# Patient Record
Sex: FEMALE | Race: BLACK OR AFRICAN AMERICAN
[De-identification: names, ages, dates, MRNs, and addresses within clinical notes are randomized per-mention and may not be internally consistent; named-entity substitution may affect disease eponyms.]

---

## 2017-12-06 ENCOUNTER — HOSPITAL ENCOUNTER (EMERGENCY)
Dept: HOSPITAL 74 - JER | Age: 67
LOS: 1 days | Discharge: HOME | End: 2017-12-07
Payer: SELF-PAY

## 2017-12-06 VITALS — DIASTOLIC BLOOD PRESSURE: 87 MMHG | TEMPERATURE: 97.6 F | SYSTOLIC BLOOD PRESSURE: 156 MMHG | HEART RATE: 72 BPM

## 2017-12-06 VITALS — BODY MASS INDEX: 30.1 KG/M2

## 2017-12-06 DIAGNOSIS — E10.649: Primary | ICD-10-CM

## 2017-12-06 DIAGNOSIS — Z79.4: ICD-10-CM

## 2017-12-06 LAB
ALBUMIN SERPL-MCNC: 3.6 G/DL (ref 3.4–5)
ALP SERPL-CCNC: 98 U/L (ref 45–117)
ALT SERPL-CCNC: 17 U/L (ref 12–78)
ANION GAP SERPL CALC-SCNC: 4 MMOL/L (ref 8–16)
AST SERPL-CCNC: 9 U/L (ref 15–37)
BASOPHILS # BLD: 1 % (ref 0–2)
BILIRUB SERPL-MCNC: 0.3 MG/DL (ref 0.2–1)
CALCIUM SERPL-MCNC: 8.6 MG/DL (ref 8.5–10.1)
CK SERPL-CCNC: 66 IU/L (ref 26–192)
CO2 SERPL-SCNC: 28 MMOL/L (ref 21–32)
CREAT SERPL-MCNC: 0.9 MG/DL (ref 0.55–1.02)
DEPRECATED RDW RBC AUTO: 14.4 % (ref 11.6–15.6)
EOSINOPHIL # BLD: 0.9 % (ref 0–4.5)
GLUCOSE SERPL-MCNC: 133 MG/DL (ref 74–106)
MAGNESIUM SERPL-MCNC: 2.1 MG/DL (ref 1.8–2.4)
MCH RBC QN AUTO: 29.8 PG (ref 25.7–33.7)
MCHC RBC AUTO-ENTMCNC: 33.2 G/DL (ref 32–36)
MCV RBC: 89.7 FL (ref 80–96)
NEUTROPHILS # BLD: 74.7 % (ref 42.8–82.8)
PLATELET # BLD AUTO: 262 K/MM3 (ref 134–434)
PMV BLD: 9.3 FL (ref 7.5–11.1)
PROT SERPL-MCNC: 7.3 G/DL (ref 6.4–8.2)
TROPONIN I SERPL-MCNC: 0.06 NG/ML (ref 0–0.05)
WBC # BLD AUTO: 8.1 K/MM3 (ref 4–10)

## 2017-12-06 NOTE — PDOC
History of Present Illness





- General


Exam Limitations: No Limitations





- History of Present Illness


Initial Comments: 





12/06/17 20:39


66 year old female, with significant past medical history of newly diagnosed 

IDDM (on both novolog and lantus), who presents to the emergency room BIBA 

after a brief episode of lightheadedness while at the [employer] just prior 

to presentation. EMS states that her blood glucose was 60 upon arrival to the 

scene and they administered 1gram of glucagon en route. The patient explains 

that as a result of being a newly diagnosed diabetic, her appetite has 

decreased. She states that she was running errands all day today and did not 

eat much throughout the day. She felt lightheaded like she was going to pass out

, so she called EMS. She denies LOC, chest pain, SOB, headache, nausea, 

vomiting. Denies recent illness, fever, chills. 





Allergies: NKDA 


PCP: Dr. Adames 








<Lucy Mcpherson - Last Filed: 12/07/17 00:25>





- General


History Source: Patient





<Cj Felix - Last Filed: 12/07/17 19:36>





- General


Chief Complaint: Blood Sugar Problem


Stated Complaint: Blood Sugar Problem


Time Seen by Provider: 12/06/17 20:25





Past History





<Lucy Mcpherson - Last Filed: 12/07/17 00:25>





- Suicide/Smoking/Psychosocial Hx


Smoking History: Never smoked


Have you smoked in the past 12 months: No


Information on smoking cessation initiated: No


Hx Alcohol Use: No


Drug/Substance Use Hx: No





<Cj Felix - Last Filed: 12/07/17 19:36>





- Past Medical History


Allergies/Adverse Reactions: 


 Allergies











Allergy/AdvReac Type Severity Reaction Status Date / Time


 


No Known Allergies Allergy   Verified 12/06/17 23:35











Home Medications: 


Ambulatory Orders





Hydrocortisone 20 mg PO BID 12/06/17 


Levothyroxine [Synthroid -] 112 mcg PO DAILY 12/06/17 


Pantoprazole Sodium [Protonix -] 20 mg PO DAILY 12/06/17 











**Review of Systems





- Review of Systems


Able to Perform ROS?: Yes


Comments:: 





12/06/17 20:39


CONSTITUTIONAL:


Present: prior to episode she felt dizzy and lightheaded, but otherwise all 

other review of systems negative. 


Absent: fever, no chills, no fatigue


EYES:


Absent: visual changes


ENT:


Absent: ear pain, no sore throat


CARDIOVASCULAR:


Absent: chest pain, no palpitations


RESPIRATORY:


Absent: cough, no SOB


GI:


Absent: abdominal pain, no nausea, no vomiting, no constipation, no diarrhea


GENITOURINARY:


Absent: dysuria, no frequency, no hematuria


MUSCULOSKELETAL:


Absent: back pain, no arthralgia, no myalgia


SKIN:


Absent: rash








<Lucy Mcpherson - Last Filed: 12/07/17 00:25>





*Physical Exam





- Vital Signs


 Last Vital Signs











Temp Pulse Resp BP Pulse Ox


 


 97.6 F   72   18   156/87   100 


 


 12/06/17 19:54  12/06/17 19:54  12/06/17 19:54  12/06/17 19:54  12/06/17 19:54














- Physical Exam


Comments: 





12/06/17 20:40


GENERAL: 


Well-appearing, well-nourished. No apparent distress.


HEENT: 


Normocephalic, atraumatic. PERRL, EOM intact.


CARDIOVASCULAR: 


Normal S1, S2. Regular rate and rhythm.


PULMONARY: 


Clear to auscultation bilaterally.


ABDOMEN: 


Soft, non-distended, non-tender. 


EXTREMITIES: 


Normal ROM in all four extremities. No gross deformities.


SKIN: 


Warm, dry.  No rash


NEUROLOGICAL: 


No focal neurological deficits.








<Lucy Mcpherson - Last Filed: 12/07/17 00:25>





- Vital Signs


 Last Vital Signs











Temp Pulse Resp BP Pulse Ox


 


 97.6 F   72   18   156/87   100 


 


 12/06/17 19:54  12/06/17 19:54  12/06/17 19:54  12/06/17 19:54  12/06/17 19:54














<Cj Felix - Last Filed: 12/07/17 19:36>





**Heart Score/ECG Review


  ** #2


General ECG Interpretation: Sinus Rhythm


Compared to previous ECG there are: No significant change





  ** #1


General ECG Interpretation: Sinus Rhythm, Normal Rate





<Lucy Mcpherson - Last Filed: 12/07/17 00:25>





ED Treatment Course





- LABORATORY


CBC & Chemistry Diagram: 


 12/06/17 20:50





 12/06/17 20:50





<Lucy Mcpherson - Last Filed: 12/07/17 00:25>





- LABORATORY


CBC & Chemistry Diagram: 


 12/06/17 20:50





 12/06/17 20:50





<Cj Felix - Last Filed: 12/07/17 19:36>





Medical Decision Making





- Medical Decision Making





12/07/17 01:11


Dr. Felix: The scribe's documentation has been prepared under my direction 

and personally reviewed by me in its entirery. I confirm that the note above 

accurately reflects all work, treatment, procedures, and medical decision 

making performed by me.


12/07/17 19:36


 Troponins x 2 were the same.  Pt felt well and was discharged home





<Cj Felix - Last Filed: 12/07/17 19:36>





*DC/Admit/Observation/Transfer





- Attestations


Scribe Attestion: 





12/06/17 20:40





Documentation prepared by MELIA Eagle, acting as medical scribe 

for Cj Felix MD.





<Lucy Mcpherson - Last Filed: 12/07/17 00:25>





- Discharge Dispostion


Admit: No





<Cj Felix - Last Filed: 12/07/17 19:36>


Diagnosis at time of Disposition: 


 Hypoglycemia, IDDM (insulin dependent diabetes mellitus)








- Discharge Dispostion


Disposition: HOME


Condition at time of disposition: Stable





- Referrals


Referrals: 


Prasanth Adames MD [Primary Care Provider] - 





- Patient Instructions


Printed Discharge Instructions:  DI for Diabetes Type 1 -- Adult, DI for 

Hypoglycemia


Additional Instructions: 


Please be sure you eat often and constantly check your sugar so you don't 

develope low blood sugar again.  Follow up with your doctor.  Return if any 

problems.  





- Post Discharge Activity

## 2017-12-07 LAB
CK SERPL-CCNC: 84 IU/L (ref 26–192)
TROPONIN I SERPL-MCNC: 0.06 NG/ML (ref 0–0.05)

## 2017-12-07 NOTE — EKG
Test Reason : 

Blood Pressure : ***/*** mmHG

Vent. Rate : 071 BPM     Atrial Rate : 071 BPM

   P-R Int : 132 ms          QRS Dur : 090 ms

    QT Int : 414 ms       P-R-T Axes : 033 013 064 degrees

   QTc Int : 449 ms

 

NORMAL SINUS RHYTHM

NONSPECIFIC T WAVE ABNORMALITY

ABNORMAL ECG

WHEN COMPARED WITH ECG OF 06-DEC-2017 21:00,

NO SIGNIFICANT CHANGE WAS FOUND

Confirmed by IGNACIO LEES MD ([id number]) on 12/7/2017 11:35:09 AM

 

Referred By:             Confirmed By:IGNACIO LEES MD

## 2017-12-07 NOTE — EKG
Test Reason : 

Blood Pressure : ***/*** mmHG

Vent. Rate : 062 BPM     Atrial Rate : 062 BPM

   P-R Int : 122 ms          QRS Dur : 114 ms

    QT Int : 406 ms       P-R-T Axes : 034 000 087 degrees

   QTc Int : 412 ms

 

NORMAL SINUS RHYTHM

MINIMAL VOLTAGE CRITERIA FOR LVH, MAY BE NORMAL VARIANT

NONSPECIFIC T WAVE ABNORMALITY

ABNORMAL ECG

WHEN COMPARED WITH ECG OF 06-DEC-2017 20:57,

PREVIOUS ECG HAS UNDETERMINED RHYTHM, NEEDS REVIEW

Confirmed by YOAAN PATTERSON, IGNACIO ([id number]) on 12/7/2017 11:34:01 AM

 

Referred By:             Confirmed By:IGNACIO LEES MD

## 2017-12-25 ENCOUNTER — HOSPITAL ENCOUNTER (OUTPATIENT)
Dept: HOSPITAL 74 - JER | Age: 67
Setting detail: OBSERVATION
LOS: 1 days | Discharge: HOME | End: 2017-12-26
Attending: INTERNAL MEDICINE | Admitting: HOSPITALIST
Payer: COMMERCIAL

## 2017-12-25 VITALS — BODY MASS INDEX: 30.1 KG/M2

## 2017-12-25 DIAGNOSIS — Z79.84: ICD-10-CM

## 2017-12-25 DIAGNOSIS — I10: ICD-10-CM

## 2017-12-25 DIAGNOSIS — E66.01: ICD-10-CM

## 2017-12-25 DIAGNOSIS — Z79.4: ICD-10-CM

## 2017-12-25 DIAGNOSIS — N17.9: ICD-10-CM

## 2017-12-25 DIAGNOSIS — E11.649: Primary | ICD-10-CM

## 2017-12-25 LAB
ALBUMIN SERPL-MCNC: 4 G/DL (ref 3.4–5)
ALP SERPL-CCNC: 86 U/L (ref 45–117)
ALT SERPL-CCNC: 17 U/L (ref 12–78)
ANION GAP SERPL CALC-SCNC: 8 MMOL/L (ref 8–16)
APPEARANCE UR: CLEAR
AST SERPL-CCNC: 11 U/L (ref 15–37)
BACTERIA #/AREA URNS HPF: (no result) /HPF
BASOPHILS # BLD AUTO: 0.1 # (ref 0.1–1)
BASOPHILS # BLD: 1.8 % (ref 0–2)
BILIRUB SERPL-MCNC: 0.5 MG/DL (ref 0.2–1)
BILIRUB UR STRIP.AUTO-MCNC: NEGATIVE MG/DL
CALCIUM SERPL-MCNC: 9.3 MG/DL (ref 8.5–10.1)
CO2 SERPL-SCNC: 29 MMOL/L (ref 21–32)
COLOR UR: (no result)
CREAT SERPL-MCNC: 1.1 MG/DL (ref 0.55–1.02)
DEPRECATED RDW RBC AUTO: 14.7 % (ref 11.6–15.6)
EOSINOPHIL # BLD: 0 # (ref 0–4.5)
EOSINOPHIL # BLD: 0.3 % (ref 0–4.5)
GLUCOSE SERPL-MCNC: 87 MG/DL (ref 74–106)
KETONES UR QL STRIP: NEGATIVE
LEUKOCYTE ESTERASE UR QL STRIP.AUTO: (no result)
LEUKOCYTE ESTERASE UR QL STRIP: (no result)
LYMPHOCYTES # BLD: 1.7 10*3/UL (ref 8–40)
MCH RBC QN AUTO: 29.2 PG (ref 25.7–33.7)
MCHC RBC AUTO-ENTMCNC: 32.6 G/DL (ref 32–36)
MCV RBC: 89.4 FL (ref 80–96)
MONOCYTES # BLD: 0.6 # (ref 3.8–10.2)
MUCOUS THREADS URNS QL MICRO: (no result)
NEUTROPHILS # BLD: 5.2 # (ref 42.8–82.8)
NEUTROPHILS # BLD: 67.7 % (ref 42.8–82.8)
NITRITE UR QL STRIP: NEGATIVE
PH UR: 6 [PH] (ref 5–8)
PHOSPHATE SERPL-MCNC: 3.5 MG/DL (ref 2.5–4.9)
PLATELET # BLD AUTO: 327 K/MM3 (ref 134–434)
PMV BLD: 8.7 FL (ref 7.5–11.1)
PROT SERPL-MCNC: 8.1 G/DL (ref 6.4–8.2)
PROT UR QL STRIP: NEGATIVE
PROT UR QL STRIP: NEGATIVE
RBC # UR STRIP: (no result) /UL
RBC #/AREA URNS HPF: 9 /HPF (ref 0–3)
SP GR UR: 1.01 (ref 1–1.03)
UROBILINOGEN UR STRIP-MCNC: NEGATIVE MG/DL (ref 0.2–1)
WBC # BLD AUTO: 7.7 K/MM3 (ref 4–10)
WBC #/AREA URNS HPF: 7 /HPF (ref 3–5)

## 2017-12-25 PROCEDURE — 3E013VG INTRODUCTION OF INSULIN INTO SUBCUTANEOUS TISSUE, PERCUTANEOUS APPROACH: ICD-10-PCS | Performed by: INTERNAL MEDICINE

## 2017-12-25 PROCEDURE — G0378 HOSPITAL OBSERVATION PER HR: HCPCS

## 2017-12-25 PROCEDURE — 3E033GC INTRODUCTION OF OTHER THERAPEUTIC SUBSTANCE INTO PERIPHERAL VEIN, PERCUTANEOUS APPROACH: ICD-10-PCS | Performed by: INTERNAL MEDICINE

## 2017-12-25 NOTE — HP
CHIEF COMPLAINT: Hypoglycemia 





PCP: Dr. Mcgowan 





HISTORY OF PRESENT ILLNESS:


67 year old AA female with Pmhx of DM , HTN who presented to ED by EMS after 

she fund unresponsive. EMS found her to be hypoglycemic of 20  , they gave her 

glucagon and sugar that improve her blood sugar to 60. 


last meal was around 12 pm , she was ready to eat dinner when her grand 

daughter found her unresponsive. patient regain her consciousness before she 

got to the hospital. 


family denies any seizure activity before or after blacked out. on ED she 

denies any complains , she denies any fever, chills, headache, lightheadedness, 

N/V/D/C. She denies any chest pain, palpitation, sob, cough, abdominal pain, or 

any urinary symptoms. she had similar one episode before . she reports having 

brain surgery this year for pituitary tumor.











ER course was notable for:


(1) Glu 87 , D5w 


(2) BUn/Cr 18/1.1


(3) CXR 





Recent Travel:denies 





PAST MEDICAL HISTORY: DM, HTN , Pituitary tumor 





PAST SURGICAL HISTORY:


brain surgery this year for Pituitary tumor 


Social History:


Smoking:denies    


Alcohol:denies 


Drugs: denies 





Family History:


Allergies





No Known Allergies Allergy (Verified 12/25/17 20:07)


 








HOME MEDICATIONS:


REVIEW OF SYSTEMS


CONSTITUTIONAL: 


Absent:  fever, chills, diaphoresis, generalized weakness, malaise, loss of 

appetite, weight change


HEENT: 


Absent:  rhinorrhea, nasal congestion, throat pain, throat swelling, difficulty 

swallowing, mouth swelling, ear pain, eye pain, visual changes


CARDIOVASCULAR: 


Absent: chest pain, syncope, palpitations, irregular heart rate, lightheadedness

, peripheral edema


RESPIRATORY: 


Absent: cough, shortness of breath, dyspnea with exertion, orthopnea, wheezing, 

stridor, hemoptysis


GASTROINTESTINAL:


Absent: abdominal pain, abdominal distension, nausea, vomiting, diarrhea, 

constipation, melena, hematochezia


GENITOURINARY: 


Absent: dysuria, frequency, urgency, hesitancy, hematuria, flank pain, genital 

pain


MUSCULOSKELETAL: 


Absent: myalgia, arthralgia, joint swelling, back pain, neck pain


SKIN: 


Absent: rash, itching, pallor


HEMATOLOGIC/IMMUNOLOGIC: 


Absent: easy bleeding, easy bruising, lymphadenopathy, frequent infections


ENDOCRINE:


Absent: unexplained weight gain, unexplained weight loss, heat intolerance, 

cold intolerance


NEUROLOGIC: 


Absent: headache, focal weakness or paresthesias, dizziness, unsteady gait, 

seizure, mental status changes, bladder or bowel incontinence


PSYCHIATRIC: 


Absent: anxiety, depression, suicidal or homicidal ideation, hallucinations.








PHYSICAL EXAMINATION


 Vital Signs - 24 hr











  12/25/17





  20:07


 


Temperature 98.1 F


 


Pulse Rate 61


 


Respiratory 14





Rate 


 


Blood Pressure 131/102


 


O2 Sat by Pulse 100





Oximetry (%) 











GENERAL: Awake, alert, and fully oriented, in no acute distress.


HEAD: Normal with no signs of trauma.


EYES: Pupils equal, round and reactive to light,  sclera anicteric, conjunctiva 

clear. 


EARS, NOSE, THROAT: Moist mucous membranes.


NECK: Normal range of motion, supple without lymphadenopathy, JVD,


LUNGS: Breath sounds equal, clear to auscultation bilaterally. No wheezes, and 

no crackles. No accessory muscle use.


HEART: Regular rate and rhythm, normal S1 and S2 without murmur, rub or gallop.


ABDOMEN: Soft, nontender, not distended, normoactive bowel sounds, no guarding, 

no rebound, no masses.  No hepatomegaly or  splenomegaly. 





LOWER EXTREMITIES: 2+ pulses, warm, well-perfused. No calf tenderness. No 

peripheral edema. 


NEUROLOGICAL:  good mentation  Normal speech. Normal gait.


PSYCHIATRIC: Cooperative. Good eye contact. Appropriate mood and affect.


SKIN: Warm, dry, normal turgor, no rashes or lesions noted, normal capillary 

refill. 


 Laboratory Results - last 24 hr











  12/25/17 12/25/17 12/25/17





  20:14 20:28 20:36


 


WBC   


 


RBC   


 


Hgb   


 


Hct   


 


MCV   


 


MCH   


 


MCHC   


 


RDW   


 


Plt Count   


 


MPV   


 


Neutrophils %   


 


Lymphocytes %   


 


Monocytes %   


 


Eosinophils %   


 


Basophils %   


 


Sodium   


 


Potassium   


 


Chloride   


 


Carbon Dioxide   


 


Anion Gap   


 


BUN   


 


Creatinine   


 


Creat Clearance w eGFR   


 


POC Glucometer  [id number]  


 


Random Glucose   


 


Calcium   


 


Phosphorus   


 


Magnesium    1.9


 


Total Bilirubin   


 


AST   


 


ALT   


 


Alkaline Phosphatase   


 


Total Protein   


 


Albumin   


 


Urine Color   Straw 


 


Urine Appearance   Clear 


 


Urine pH   6.0 


 


Ur Specific Gravity   1.009 


 


Urine Protein   Negative 


 


Urine Glucose (UA)   Negative 


 


Urine Ketones   Negative 


 


Urine Blood   1+ H 


 


Urine Nitrite   Negative 


 


Urine Bilirubin   Negative 


 


Urine Urobilinogen   Negative 


 


Ur Leukocyte Esterase   1+ H 


 


Urine WBC (Auto)   7 


 


Urine RBC (Auto)   9 


 


Ur Epithelial Cells   Rare 


 


Urine Bacteria   Rare 


 


Urine Mucus   Rare 














  12/25/17 12/25/17





  20:36 20:36


 


WBC  7.7 


 


RBC  4.95 


 


Hgb  14.4 


 


Hct  44.3 


 


MCV  89.4 


 


MCH  29.2 


 


MCHC  32.6 


 


RDW  14.7 


 


Plt Count  327 


 


MPV  8.7 


 


Neutrophils %  67.7 


 


Lymphocytes %  22.4 


 


Monocytes %  7.8 


 


Eosinophils %  0.3 


 


Basophils %  1.8 


 


Sodium   142


 


Potassium   3.5


 


Chloride   105


 


Carbon Dioxide   29


 


Anion Gap   8


 


BUN   18


 


Creatinine   1.1 H


 


Creat Clearance w eGFR   49.85


 


POC Glucometer  


 


Random Glucose   87


 


Calcium   9.3


 


Phosphorus   3.5


 


Magnesium  


 


Total Bilirubin   0.5


 


AST   11 L


 


ALT   17


 


Alkaline Phosphatase   86


 


Total Protein   8.1


 


Albumin   4.0


 


Urine Color  


 


Urine Appearance  


 


Urine pH  


 


Ur Specific Gravity  


 


Urine Protein  


 


Urine Glucose (UA)  


 


Urine Ketones  


 


Urine Blood  


 


Urine Nitrite  


 


Urine Bilirubin  


 


Urine Urobilinogen  


 


Ur Leukocyte Esterase  


 


Urine WBC (Auto)  


 


Urine RBC (Auto)  


 


Ur Epithelial Cells  


 


Urine Bacteria  


 


Urine Mucus  





 CBC, BMP





 12/25/17 20:36 





 12/25/17 20:36 








12/25/17 


CXR : reviewed 


ASSESSMENT/PLAN:


67 year old AA female with Pmhx of DM , HTN who presented to ED by EMS after 

she fund unresponsive , was found to have sever hypoglycemia and was admitted 

for further evaluation





# Hypoglycemia 


* Hold home meds 


* ISS 


* BGM Q4h


* hgbA1c 


* monitor 


* admit to observe 


* please confirm meds with pcp.


* low sodium diabetic diet 


* 


# DANITA , possible 2/2 low oral intake 


* BUN/cr 18/1.1


* IV fluids 


* repeat BMP in AM 


* urine lytes


* avoid nephrotoxic agents 


* 


# HTN, 


continue home meds





# Moribid obesity 


* consulted about loosing weight with goal one pound per week 


* consulted about diabetic diet, witl small multiple meals , and modified life 

style 


#FEN 


* D5w received in ED , 


* E: WNL , monitor 


* N: low sodium diabetic diet 











# proph 


* SCDs both legs 


* 


# dispo 


* Admit to obs 





Visit type





- Emergency Visit


Emergency Visit: Yes


ED Registration Date: 12/25/17


Care time: The patient presented to the Emergency Department on the above date 

and was hospitalized for further evaluation of their emergent condition.





- New Patient


This patient is new to me today: Yes


Date on this admission: 12/26/17





- Critical Care


Critical Care patient: No

## 2017-12-25 NOTE — PDOC
History of Present Illness





<Walker Rodriguez - Last Filed: 12/25/17 22:17>





- History of Present Illness


Initial Comments: 





12/25/17 21:06


"The patient is a 67 year old female, with a significant past medical history 

of DM and hypertension, who presents to the emergency department via EMS with 

low blood sugar. EMS reports that they were called because the family found the 

patient confused and not responding appropriately to questions. When they 

arrived, they checked her sugar and found it to be <20. They gave her glucagon 

and the sugar improved to 45, then subsequently to 60. Pt's mental status 

returned to baseline. The patient reports her last meal was around lunchtime 

and states she took her diabetes medication after eating this afternoon. Pt 

states that she is not taking insulin. However, upon review of her med list 

with her daughter via telephone, I found that she is taking Lantus and novolog. 

Daughter is unable to tell me the doses. Daughter states that pt has been 

having difficulty keeping track of her medications, often missing doses or 

taking them at the wrong times. Pt has had one prior episode of severe 

hypoglycemia which led to a similar clinical picture.





Pt currently denies any complaints. She denies recent fevers, chills, headache 

or dizziness. She denies recent nausea, vomit, diarrhea or constipation. She 

denies recent dysuria, frequency, urgency or hematuria. She denies recent chest 

pain or shortness of breath.





Allergies: NKA


Past surgical history: None reported.


Primary Care Physician: Dr. Mcgowan 


"





<Carlos Valdez - Last Filed: 12/25/17 22:53>





- General


Chief Complaint: Blood Sugar Problem


Stated Complaint: Blood Sugar Problem


Time Seen by Provider: 12/25/17 20:09





Past History





<Walker Rodriguez - Last Filed: 12/25/17 22:17>





- Suicide/Smoking/Psychosocial Hx


Smoking History: Never smoked


Have you smoked in the past 12 months: No


Information on smoking cessation initiated: No


Hx Alcohol Use: No


Drug/Substance Use Hx: No





<Carlos Valdez - Last Filed: 12/25/17 22:53>





- Past Medical History


Allergies/Adverse Reactions: 


 Allergies











Allergy/AdvReac Type Severity Reaction Status Date / Time


 


No Known Allergies Allergy   Verified 12/25/17 20:07














**Review of Systems





- Review of Systems


Comments:: 





12/25/17 21:13


"GENERAL/CONSTITUTIONAL: +Confusion. No fever or chills. No weakness. 


HEAD, EYES, EARS, NOSE AND THROAT: No change in vision. No ear pain or 

discharge. No sore throat. 


CARDIOVASCULAR: No chest pain or shortness of breath.


 RESPIRATORY: No cough, wheezing, or hemoptysis. 


GASTROINTESTINAL: No nausea, vomiting, diarrhea or constipation. 


GENITOURINARY: No dysuria, frequency, or change in urination. 


MUSCULOSKELETAL: No joint or muscle swelling or pain. No neck or back pain. 


SKIN: No rash NEUROLOGIC: No headache, vertigo, loss of consciousness, or 

change in strength/sensation. 


ENDOCRINE: No increased thirst. No abnormal weight change. 


HEMATOLOGIC/LYMPHATIC: No anemia, easy bleeding, or history of blood clots. 


ALLERGIC/IMMUNOLOGIC: No hives or skin allergy.


"





<Carlos Valdez - Last Filed: 12/25/17 22:53>





*Physical Exam





- Vital Signs


 Last Vital Signs











Temp Pulse Resp BP Pulse Ox


 


 98.1 F   61   14   131/102   100 


 


 12/25/17 20:07  12/25/17 20:07  12/25/17 20:07  12/25/17 20:07  12/25/17 20:07














<Walker Rodriguez - Last Filed: 12/25/17 22:17>





- Vital Signs


 Last Vital Signs











Temp Pulse Resp BP Pulse Ox


 


 98.1 F   61   14   131/102   100 


 


 12/25/17 20:07  12/25/17 20:07  12/25/17 20:07  12/25/17 20:07  12/25/17 20:07














- Physical Exam


Comments: 





12/25/17 21:13


"GENERAL: Awake, alert, and confused, in no acute distress


HEAD: No signs of trauma 


EYES: PERRLA, EOMI, sclera anicteric, conjunctiva clear


ENT: Auricles normal inspection, hearing grossly normal, nares patent, 

oropharynx clear without exudates. Moist mucosa 


NECK: Nontender, no stepoffs, Normal ROM, supple, no lymphadenopathy, JVD, or 

masses LUNGS: Breath sounds equal, clear to auscultation bilaterally. No wheezes

, and no crackles 


HEART: Regular rate and rhythm, normal S1 and S2, no murmurs, rubs or gallops 


ABDOMEN: Soft, nontender, normoactive bowel sounds. No guarding, no rebound. No 

masses 


EXTREMITIES: Normal range of motion, no edema. No clubbing or cyanosis. No cords

, erythema, or tenderness


NEUROLOGICAL: Cranial nerves II through XII intact. 5/5 strength and sensation 

in all extremities, Normal speech, normal gait SKIN: Warm, Dry, normal turgor, 

no rashes or lesions noted.


"





<Carlos Valdez - Last Filed: 12/25/17 22:53>





ED Treatment Course





- LABORATORY


CBC & Chemistry Diagram: 


 12/25/17 20:36





 12/25/17 20:36





- ADDITIONAL ORDERS


Additional order review: 


 Laboratory  Results











  12/25/17 12/25/17 12/25/17





  20:36 20:36 20:28


 


Sodium  142  


 


Potassium  3.5  


 


Chloride  105  


 


Carbon Dioxide  29  


 


Anion Gap  8  


 


BUN  18  


 


Creatinine  1.1 H  


 


Creat Clearance w eGFR  49.85  


 


POC Glucometer   


 


Random Glucose  87  


 


Calcium  9.3  


 


Phosphorus  3.5  


 


Magnesium   1.9 


 


Total Bilirubin  0.5  


 


AST  11 L  


 


ALT  17  


 


Alkaline Phosphatase  86  


 


Total Protein  8.1  


 


Albumin  4.0  


 


Urine Color    Straw


 


Urine Appearance    Clear


 


Urine pH    6.0


 


Ur Specific Gravity    1.009


 


Urine Protein    Negative


 


Urine Glucose (UA)    Negative


 


Urine Ketones    Negative


 


Urine Blood    1+ H


 


Urine Nitrite    Negative


 


Urine Bilirubin    Negative


 


Urine Urobilinogen    Negative


 


Urine WBC (Auto)    7


 


Urine RBC (Auto)    9


 


Ur Epithelial Cells    Rare


 


Urine Bacteria    Rare


 


Urine Mucus    Rare














  12/25/17





  20:14


 


Sodium 


 


Potassium 


 


Chloride 


 


Carbon Dioxide 


 


Anion Gap 


 


BUN 


 


Creatinine 


 


Creat Clearance w eGFR 


 


POC Glucometer  71.34722


 


Random Glucose 


 


Calcium 


 


Phosphorus 


 


Magnesium 


 


Total Bilirubin 


 


AST 


 


ALT 


 


Alkaline Phosphatase 


 


Total Protein 


 


Albumin 


 


Urine Color 


 


Urine Appearance 


 


Urine pH 


 


Ur Specific Gravity 


 


Urine Protein 


 


Urine Glucose (UA) 


 


Urine Ketones 


 


Urine Blood 


 


Urine Nitrite 


 


Urine Bilirubin 


 


Urine Urobilinogen 


 


Urine WBC (Auto) 


 


Urine RBC (Auto) 


 


Ur Epithelial Cells 


 


Urine Bacteria 


 


Urine Mucus 








 











  12/25/17 12/25/17





  20:36 20:14


 


RBC  4.95 


 


MCV  89.4 


 


MCHC  32.6 


 


RDW  14.7 


 


MPV  8.7 


 


Neutrophils %  67.7 


 


Lymphocytes %  22.4 


 


Monocytes %  7.8 


 


Eosinophils %  0.3 


 


Basophils %  1.8 


 


POC Glucometer   71.79106














- Medications


Given in the ED: 


ED Medications














Discontinued Medications














Generic Name Dose Route Start Last Admin





  Trade Name Freq  PRN Reason Stop Dose Admin


 


Dextrose  25 gm  12/25/17 20:18  12/25/17 20:53





  D50w (Vial) -  IVPUSH  12/25/17 20:19  25 gm





  NOW ONE   Administration














<Walker Rodriguez - Last Filed: 12/25/17 22:17>





- LABORATORY


CBC & Chemistry Diagram: 


 12/25/17 20:36





 12/25/17 20:36





- ADDITIONAL ORDERS


Additional order review: 


 Laboratory  Results











  12/25/17 12/25/17





  20:28 20:14


 


POC Glucometer   71.38071


 


Urine Color  Straw 


 


Urine Appearance  Clear 


 


Urine pH  6.0 


 


Ur Specific Gravity  1.009 


 


Urine Protein  Negative 


 


Urine Glucose (UA)  Negative 


 


Urine Ketones  Negative 


 


Urine Blood  1+ H 


 


Urine Nitrite  Negative 


 


Urine Bilirubin  Negative 


 


Urine Urobilinogen  Negative 


 


Urine WBC (Auto)  7 


 


Urine RBC (Auto)  9 


 


Ur Epithelial Cells  Rare 


 


Urine Bacteria  Rare 


 


Urine Mucus  Rare 








 











  12/25/17 12/25/17





  20:36 20:14


 


RBC  4.95 


 


MCV  89.4 


 


MCHC  32.6 


 


RDW  14.7 


 


MPV  8.7 


 


Neutrophils %  67.7 


 


Lymphocytes %  22.4 


 


Monocytes %  7.8 


 


Eosinophils %  0.3 


 


Basophils %  1.8 


 


POC Glucometer   71.78157














- RADIOLOGY


Radiology Studies Ordered: 














 Category Date Time Status


 


 CHEST X-RAY PORTABLE* [RAD] Stat Radiology  12/25/17 20:19 Taken














- Medications


Given in the ED: 


ED Medications














Discontinued Medications














Generic Name Dose Route Start Last Admin





  Trade Name Mariama  PRN Reason Stop Dose Admin


 


Dextrose  25 gm  12/25/17 20:18  12/25/17 20:53





  D50w (Vial) -  IVPUSH  12/25/17 20:19  25 gm





  NOW ONE   Administration














<Carlos Valdez - Last Filed: 12/25/17 22:53>





Medical Decision Making





- Medical Decision Making





12/25/17 21:13


67 F with altered mental status, now resolved, found to be 2/2 severe 

hypoglycemia. Pt now at neurologic baseline, AnO x3, with no neuro deficits. Pt'

s hypoglycemia likely 2/2 med non-compliance given daughter's history that pt 

often confuses her medications. Pt is on Lantus and Novolog, unclear what doses 

and what time pt actually took the meds.


- Labs


- Frequent fingersticks


- Will require obs admission due to long half-life of Lantus





12/25/17 22:53


CBC,CMP











WBC  7.7 K/mm3 (4.0-10.0)   12/25/17  20:36    


 


RBC  4.95 M/mm3 (3.60-5.2)   12/25/17  20:36    


 


Hgb  14.4 GM/dL (10.7-15.3)   12/25/17  20:36    


 


Hct  44.3 % (32.4-45.2)   12/25/17  20:36    


 


MCV  89.4 fl (80-96)   12/25/17  20:36    


 


MCH  29.2 pg (25.7-33.7)   12/25/17  20:36    


 


MCHC  32.6 g/dl (32.0-36.0)   12/25/17  20:36    


 


RDW  14.7 % (11.6-15.6)   12/25/17  20:36    


 


Plt Count  327 K/MM3 (134-434)   12/25/17  20:36    


 


MPV  8.7 fl (7.5-11.1)   12/25/17  20:36    


 


Neutrophils %  67.7 % (42.8-82.8)   12/25/17  20:36    


 


Lymphocytes %  22.4 % (8-40)   12/25/17  20:36    


 


Monocytes %  7.8 % (3.8-10.2)   12/25/17  20:36    


 


Eosinophils %  0.3 % (0-4.5)   12/25/17  20:36    


 


Basophils %  1.8 % (0-2.0)   12/25/17  20:36    


 


Sodium  142 mmol/L (136-145)   12/25/17  20:36    


 


Potassium  3.5 mmol/L (3.5-5.1)   12/25/17  20:36    


 


Chloride  105 mmol/L ()   12/25/17  20:36    


 


Carbon Dioxide  29 mmol/L (21-32)   12/25/17  20:36    


 


Anion Gap  8  (8-16)   12/25/17  20:36    


 


BUN  18 mg/dL (7-18)   12/25/17  20:36    


 


Creatinine  1.1 mg/dL (0.55-1.02)  H  12/25/17  20:36    


 


Creat Clearance w eGFR  49.85  (>60)   12/25/17  20:36    


 


POC Glucometer  71.36495 UNITS ()   12/25/17  20:14    


 


Random Glucose  87 mg/dL ()   12/25/17  20:36    


 


Calcium  9.3 mg/dL (8.5-10.1)   12/25/17  20:36    


 


Phosphorus  3.5 mg/dL (2.5-4.9)   12/25/17  20:36    


 


Magnesium  1.9 mg/dL (1.8-2.4)   12/25/17  20:36    


 


Total Bilirubin  0.5 mg/dL (0.2-1.0)   12/25/17  20:36    


 


AST  11 U/L (15-37)  L  12/25/17  20:36    


 


ALT  17 U/L (12-78)   12/25/17  20:36    


 


Alkaline Phosphatase  86 U/L ()   12/25/17  20:36    


 


Total Protein  8.1 g/dl (6.4-8.2)   12/25/17  20:36    


 


Albumin  4.0 g/dl (3.4-5.0)   12/25/17  20:36    








Spoke with Dr. Stokes, who has accepted pt for admission to obs.





<Carols Valdez - Last Filed: 12/25/17 22:53>





*DC/Admit/Observation/Transfer





- Attestations


Scribe Attestion: 





12/25/17 22:17





Documentation prepared by Walker Rodriguez, acting as medical scribe for Carlos Valdez MD.





<Walker Rodriguez - Last Filed: 12/25/17 22:17>





- Discharge Dispostion


Admit: Yes





- Attestations


Physician Attestion: 





12/25/17 22:53








I, Dr. Carlos Valdez MD, attest that this document has been prepared under my 

direction and personally reviewed by me in its entirety.   I further attest, 

that it accurately reflects all work, treatment, procedures and medical decision

-making performed by me.  





<Carlos Valdez - Last Filed: 12/25/17 22:53>


Diagnosis at time of Disposition: 


 Hypoglycemia








- Referrals


Referrals: 


Vu Mcgowan MD [Primary Care Provider] - 





- Patient Instructions





- Post Discharge Activity

## 2017-12-26 VITALS — DIASTOLIC BLOOD PRESSURE: 65 MMHG | HEART RATE: 59 BPM | TEMPERATURE: 98.2 F | SYSTOLIC BLOOD PRESSURE: 116 MMHG

## 2017-12-26 LAB
ALBUMIN SERPL-MCNC: 3.4 G/DL (ref 3.4–5)
ALP SERPL-CCNC: 81 U/L (ref 45–117)
ALT SERPL-CCNC: 16 U/L (ref 12–78)
ANION GAP SERPL CALC-SCNC: 10 MMOL/L (ref 8–16)
AST SERPL-CCNC: 12 U/L (ref 15–37)
BASOPHILS # BLD AUTO: 0.1 # (ref 0.1–1)
BASOPHILS # BLD: 1 % (ref 0–2)
BILIRUB SERPL-MCNC: 0.4 MG/DL (ref 0.2–1)
CALCIUM SERPL-MCNC: 9.6 MG/DL (ref 8.5–10.1)
CO2 SERPL-SCNC: 28 MMOL/L (ref 21–32)
CREAT SERPL-MCNC: 1 MG/DL (ref 0.55–1.02)
DEPRECATED RDW RBC AUTO: 14.9 % (ref 11.6–15.6)
EOSINOPHIL # BLD: 0.1 # (ref 0–4.5)
EOSINOPHIL # BLD: 1.5 % (ref 0–4.5)
GLUCOSE SERPL-MCNC: 100 MG/DL (ref 74–106)
LYMPHOCYTES # BLD: 2.4 10*3/UL (ref 8–40)
MCH RBC QN AUTO: 29.9 PG (ref 25.7–33.7)
MCHC RBC AUTO-ENTMCNC: 33.4 G/DL (ref 32–36)
MCV RBC: 89.5 FL (ref 80–96)
MONOCYTES # BLD: 0.6 # (ref 3.8–10.2)
NEUTROPHILS # BLD: 5 # (ref 42.8–82.8)
NEUTROPHILS # BLD: 60.5 % (ref 42.8–82.8)
PLATELET # BLD AUTO: 318 K/MM3 (ref 134–434)
PMV BLD: 9.2 FL (ref 7.5–11.1)
PROT SERPL-MCNC: 6.7 G/DL (ref 6.4–8.2)
WBC # BLD AUTO: 8.3 K/MM3 (ref 4–10)

## 2017-12-26 RX ADMIN — INSULIN ASPART SCH: 100 INJECTION, SOLUTION INTRAVENOUS; SUBCUTANEOUS at 08:13

## 2017-12-26 RX ADMIN — INSULIN ASPART SCH UNITS: 100 INJECTION, SOLUTION INTRAVENOUS; SUBCUTANEOUS at 12:42

## 2017-12-26 RX ADMIN — HEPARIN SODIUM SCH UNIT: 5000 INJECTION, SOLUTION INTRAVENOUS; SUBCUTANEOUS at 06:24

## 2017-12-26 RX ADMIN — HEPARIN SODIUM SCH: 5000 INJECTION, SOLUTION INTRAVENOUS; SUBCUTANEOUS at 16:02

## 2017-12-26 RX ADMIN — INSULIN ASPART SCH UNITS: 100 INJECTION, SOLUTION INTRAVENOUS; SUBCUTANEOUS at 16:43

## 2017-12-26 NOTE — PN
Teaching Attending Note


Name of Resident: Scout Chiu





ATTENDING PHYSICIAN STATEMENT





I saw and evaluated the patient.


I reviewed the resident's note and discussed the case with the resident.


I agree with the resident's findings and plan as documented.








SUBJECTIVE:








OBJECTIVE:








ASSESSMENT AND PLAN:


patient was admitted for hypoglycemia


monitor for signs of hypoglycemia 


education for insulin use

## 2017-12-26 NOTE — DS
Physical Examination


Vital Signs: 


 Vital Signs











Temperature  36.8 C   12/26/17 09:00


 


Pulse Rate  54 L  12/26/17 12:32


 


Respiratory Rate  18   12/26/17 12:32


 


Blood Pressure  128/70   12/26/17 12:32


 


O2 Sat by Pulse Oximetry (%)  98   12/26/17 12:32











Constitutional: Yes: Well Nourished, No Distress, Calm


Cardiovascular: Yes: Regular Rate and Rhythm.  No: Gallop, Murmur, Rub


Respiratory: Yes: Regular, CTA Bilaterally.  No: Rales, Rhonchi, Wheezes


Gastrointestinal: Yes: Normal Bowel Sounds, Soft.  No: Distention, Tenderness


Extremities: Yes: WNL


Edema: No


Labs: 


 CBC, BMP





 12/26/17 06:36 





 12/26/17 06:36 











Discharge Summary


Reason For Visit: HYPOGLYCEMIA


Current Active Problems





Hypoglycemia (Acute)








Hospital Course: 


Ms Farnsworth is a very pleasant 67 year old female who comes in with 

hypoglycemia. She says she took her metformin but did not eat and that is what 

caused her blood sugar to drop. She was admitted under observation. She was 

given dextrose in saline and her glucose improved. Her IVFs were stopped and 

she was given lunch which she tolerated. She is currently safe for discharge 

home. She was instructed to change her metformin to daily and have close follow 

up with Dr Mcgowan.


 


31 minutes spent in preparation of this discharge


Condition: Good





- Instructions


Diet, Activity, Other Instructions: 


resume previous diet and activity


Referrals: 


Vu Mcgowan MD [Primary Care Provider] - 


Disposition: HOME





- Home Medications


Comprehensive Discharge Medication List: 


Ambulatory Orders





Amlodipine Besylate 5 mg PO DAILY 12/26/17 


Cholecalciferol (Vitamin D3) [Vitamin D3] 2,000 unit PO DAILY 12/26/17 


Levothyroxine Sodium [Levo-T] 75 mcg PO DAILY 12/26/17 


Metformin HCl 500 mg PO DAILY #30 tablet 12/26/17 


Metoprolol Tartrate 50 mg PO BID 12/26/17 


Pantoprazole Sodium 40 mg PO DAILY 12/26/17

## 2017-12-26 NOTE — EKG
Test Reason : 

Blood Pressure : ***/*** mmHG

Vent. Rate : 061 BPM     Atrial Rate : 061 BPM

   P-R Int : 120 ms          QRS Dur : 102 ms

    QT Int : 428 ms       P-R-T Axes : 050 002 089 degrees

   QTc Int : 430 ms

 

NORMAL SINUS RHYTHM

NONSPECIFIC ST AND T WAVE ABNORMALITY

ABNORMAL ECG

WHEN COMPARED WITH ECG OF 25-DEC-2017 20:56,

PREVIOUS ECG HAS UNDETERMINED RHYTHM, NEEDS REVIEW

Confirmed by MD Epi, Ky ([id number]) on 12/26/2017 1:09:59 PM

 

Referred By:             Confirmed By:Ky Chowdary MD

## 2018-01-14 ENCOUNTER — HOSPITAL ENCOUNTER (INPATIENT)
Dept: HOSPITAL 74 - JER | Age: 68
LOS: 4 days | Discharge: HOME | DRG: 194 | End: 2018-01-18
Attending: INTERNAL MEDICINE | Admitting: INTERNAL MEDICINE
Payer: COMMERCIAL

## 2018-01-14 VITALS — BODY MASS INDEX: 30.7 KG/M2

## 2018-01-14 DIAGNOSIS — D72.819: ICD-10-CM

## 2018-01-14 DIAGNOSIS — J69.0: ICD-10-CM

## 2018-01-14 DIAGNOSIS — D70.9: ICD-10-CM

## 2018-01-14 DIAGNOSIS — J11.08: Primary | ICD-10-CM

## 2018-01-14 DIAGNOSIS — N17.9: ICD-10-CM

## 2018-01-14 DIAGNOSIS — E11.9: ICD-10-CM

## 2018-01-14 DIAGNOSIS — E03.9: ICD-10-CM

## 2018-01-14 DIAGNOSIS — R56.9: ICD-10-CM

## 2018-01-14 DIAGNOSIS — I10: ICD-10-CM

## 2018-01-14 DIAGNOSIS — J11.81: ICD-10-CM

## 2018-01-14 DIAGNOSIS — E78.5: ICD-10-CM

## 2018-01-14 DIAGNOSIS — R41.82: ICD-10-CM

## 2018-01-14 DIAGNOSIS — D35.2: ICD-10-CM

## 2018-01-14 DIAGNOSIS — E86.0: ICD-10-CM

## 2018-01-14 NOTE — PDOC
Attending Attestation





- Resident


Resident Name: Zaid Turner





- ED Attending Attestation


I have performed the following: I have examined & evaluated the patient, The 

case was reviewed & discussed with the resident, I agree w/resident's findings 

& plan





- HPI


HPI: 





01/15/18 01:39


Pt comes with AMS. Pt comes with lethargy and fever. Pt had a seizure in the 

ambulance as she was driving here.


Pt has normal labs, except for a positive troponin - which is prossibly solely 

cardiac damage, as she has flipped and flat Lateral T waves on the EKG. She is 

influenza A positive, and we are unable to give her Tamiflu, as she is altered.





- Physicial Exam


PE: 





01/14/18 23:54


pt is febrile; unkempt; pt has coarse breath sounds bilaterally; likely 

aspirated after her seizure; or she has a pneumonia.


Pt 





- Medical Decision Making





01/15/18 00:28


Pt has + influenza A


01/15/18 00:49


Patient Name: RANDA BISWAS


THIS IS A PRELIMINARY REPORT FROM IMAGING ON CALL


DATE OF SERVICE: 2018-01-15 00:04:13


IMAGES: 141


EXAM: CT HEAD without contrast


HISTORY: New onset lethargy


COMPARISON: None.


FINDINGS: The ventricular system is midline and nondilated. The sulcal pattern 

is normal for the


patient's age. Mild small vessel ischemic changes are noted. There is a 2.9 x 

2.9 cm suprasellar


mass with minimal calcification. Neoplasm and aneurysm are both considered. 

There is no bleed,


extra-axial fluid collection or mass effect. No skull fracture or skull lesion 

is identified. Diffuse sinus


opacification consistent with sinusitis. The bilateral mastoid air cells are 

clear.


IMPRESSION: 2.9 cm suprasellar mass may indicate neoplasm or aneurysm and 

should be


followed up with enhanced MRI/MRA or enhanced CT/CTA.


Diffuse sinusitis.


THIS DOCUMENT HAS BEEN ELECTRONICALLY SIGNED


01/15/18 01:00


Pt will need an MRI/MRA in the AM to eval the suprasellar findings (we know 

that she has a pituitary tumor, but our understanding is that she had the tumor 

removed.)


Pt has low O2 Sat on RA; she has coarse BS bilaterally.   She may have 

pneumonia or aspiration. CXR demonstrates enlarged heart and right sided 

increased markings in the lung fields


Pt has dry mouth poor dentition.  She appears dehydrated. Na/K+ electrolytes 

are normal





EKG shows flat flipped T waves laterally.


She has a + trop.


Pt is febrile to 103


01/15/18 01:11


Pt admitted to [facility] last month.  We put a page out for them.





01/15/18 02:05





Patient Name: RANDA BISWAS


THIS IS A PRELIMINARY REPORT FROM IMAGING ON CALL


DATE OF SERVICE: 2018-01-14 23:31:01


IMAGES: 2


EXAM: XR CHEST


HISTORY: Rule out pneumonia


COMPARISON: None.


FINDINGS: Left ventricular hypertrophy is noted. Left lower lobe consolidation 

may represent


pneumonia. There is a questionable small left pleural effusion.


IMPRESSION: Possible left lower lobe pneumonia with small effusion.

## 2018-01-14 NOTE — PDOC
History of Present Illness





<Destiny Whitaker - Last Filed: 01/15/18 02:20>





- General


History Source: Patient


Exam Limitations: No Limitations





- History of Present Illness


Initial Comments: 





01/14/18 23:08


The patient is a 67F with a PMH of HTN, DM, seizure disorder, hypothyroidism, s/

p pituitary ca removal who presents to the ED from home with lethargy. Hx is 

provided only by EMS. EMS states that the patient has had increased lethargy 

and AMS starting today. She's had decreased PO intake. EMS states that the 

patient had a seizure on their way to the ambulance. This was stopped with 5 

midazolam. No other history was provided. 








<Zaid Turner - Last Filed: 01/15/18 03:31>





- General


Chief Complaint: Lethargy


Stated Complaint: ALTERED MENTAL STATUS


Time Seen by Provider: 01/14/18 22:47





Past History





<Destiny Whitaker - Last Filed: 01/15/18 02:20>





- Past Medical History


Anemia: No


Asthma: No


Cancer: No


Cardiac Disorders: No


CVA: No


COPD: No


CHF: No


Dementia: No


Diabetes: Yes


GI Disorders: No


 Disorders: No


HTN: Yes


Hypercholesterolemia: No


Liver Disease: No


Seizures: No


Thyroid Disease: No





- Surgical History


Abdominal Surgery: No


Appendectomy: No


Cardiac Surgery: No


Cholecystectomy: No


Lung Surgery: No


Neurologic Surgery: No


Orthopedic Surgery: No





- Suicide/Smoking/Psychosocial Hx


Smoking History: Unknown if ever smoked


Have you smoked in the past 12 months: No


Information on smoking cessation initiated: No


Hx Alcohol Use: No


Drug/Substance Use Hx: No


Substance Use Type: None


Hx Substance Use Treatment: No





<Zaid Turner - Last Filed: 01/15/18 03:31>





- Past Medical History


Allergies/Adverse Reactions: 


 Allergies











Allergy/AdvReac Type Severity Reaction Status Date / Time


 


No Known Allergies Allergy   Verified 01/14/18 22:46











Home Medications: 


Ambulatory Orders





Amlodipine Besylate 5 mg PO DAILY 12/26/17 


Cholecalciferol (Vitamin D3) [Vitamin D3] 2,000 unit PO DAILY 12/26/17 


Levothyroxine Sodium [Levo-T] 75 mcg PO DAILY 12/26/17 


Metformin HCl 500 mg PO DAILY #30 tablet 12/26/17 


Metoprolol Tartrate 50 mg PO BID 12/26/17 


Pantoprazole Sodium 40 mg PO DAILY 12/26/17 











**Review of Systems





- Review of Systems


Able to Perform ROS?: No (Clinical condition)


Is the patient limited English proficient: No





<Zaid Turner - Last Filed: 01/15/18 03:31>





*Physical Exam





- Vital Signs


 Last Vital Signs











Temp Pulse Resp BP Pulse Ox


 


 103.9 F H  92 H  16   148/92   93 L


 


 01/15/18 00:01  01/14/18 22:46  01/14/18 22:46  01/14/18 22:46  01/14/18 22:46














<Maricel Whitakerreen - Last Filed: 01/15/18 02:20>





- Vital Signs


 Last Vital Signs











Temp Pulse Resp BP Pulse Ox


 


 103.4 F H  92 H  16   148/92   93 L


 


 01/14/18 23:03  01/14/18 22:46  01/14/18 22:46  01/14/18 22:46  01/14/18 22:46














- Physical Exam


Comments: 





01/14/18 23:32


GENERAL: Well developed, well nourished. Lethargic. 


HEENT: Normocephalic, atraumatic. Hearing grossly normal. Moist mucous 

membranes. 


NECK: Supple. Full ROM. No JVD.


CARDIOVASCULAR: Regular rate and rhythm. No murmurs, rubs, or gallops. 


PULMONARY: No evidence of respiratory distress. Transmitted upper respiratory 

sounds heard bilaterally.


ABDOMINAL: Soft. Non-tender. Non-distended. No rebound or guarding.  


MUSCULOSKELETAL: No bony deformities or tenderness. 


EXTREMITIES: No cyanosis. No clubbing. No edema. No calf tenderness.


SKIN: Warm and dry. Normal capillary refill. No rashes. No jaundice. 


NEUROLOGICAL: Lethargic, localizes to painful stimuli, not responding to verbal 

stimuli. 


PSYCHIATRIC: Cooperative. Good eye contact. Appropriate mood and affect.








<Zaid Turner - Last Filed: 01/15/18 03:31>





ED Treatment Course





- LABORATORY


CBC & Chemistry Diagram: 


 01/14/18 23:55





 01/14/18 23:55





- ADDITIONAL ORDERS


Additional order review: 


 Laboratory  Results











  01/14/18 01/14/18 01/14/18





  23:55 23:55 23:55


 


PT with INR    13.10 H


 


INR    1.16 H


 


PTT (Actin FS)  32.8  


 


Sodium   140 


 


Potassium   3.5 


 


Chloride   104 


 


Carbon Dioxide   27 


 


Anion Gap   9 


 


BUN   6 L 


 


Creatinine   1.2 H 


 


Creat Clearance w eGFR   45.09 


 


Random Glucose   137 H 


 


Calcium   8.9 


 


Total Bilirubin   0.9  D 


 


AST   62 H D 


 


ALT   59  D 


 


Alkaline Phosphatase   69 


 


Creatine Kinase   93 


 


Troponin I   0.13 H 


 


Total Protein   7.2 


 


Albumin   3.7 




















 01/14/18 00:01 Influenza Types A,B Antigen (REINA) - Preliminary





 Nasopharyngeal Swab  - Preliminary








 











  01/14/18





  23:55


 


RBC  4.68


 


MCV  88.7


 


MCHC  33.4


 


RDW  14.9


 


MPV  9.7


 


Neutrophils %  62.6


 


Lymphocytes %  21.4  D


 


Monocytes %  13.3 H


 


Eosinophils %  1.4


 


Basophils %  1.3














- RADIOLOGY


Radiology Studies Ordered: 














 Category Date Time Status


 


 CHEST X-RAY PORTABLE* [RAD] Stat Radiology  01/14/18 23:03 Taken














- Medications


Given in the ED: 


ED Medications














Discontinued Medications














Generic Name Dose Route Start Last Admin





  Trade Name Freq  PRN Reason Stop Dose Admin


 


Acetaminophen  1,000 mg  01/14/18 23:29  01/14/18 23:53





  Ofirmev Injection -  IVPB  01/14/18 23:30  1,000 mg





  ONCE ONE   Administration


 


Sodium Chloride  1,000 ml  01/14/18 23:14  01/14/18 23:52





  Normal Saline -  IV  01/14/18 23:15  1,000 ml





  ONCE ONE   Administration














<Destiny Whitaker - Last Filed: 01/15/18 02:20>





- LABORATORY


CBC & Chemistry Diagram: 


 01/14/18 23:55





 01/14/18 23:55





<Zaid Turner - Last Filed: 01/15/18 03:31>





Medical Decision Making





- Medical Decision Making





01/15/18 03:21


The patient is a 67F with a PMH of HTN, DM, seizure disorder, hypothyroidism, s/

p pituitary ca removal who presents to the ED with lethargy. BGL was 170. 

Septic protocol is being followed as the patient has a temp of 103. Flu A 

positive. Fluids and broad spectrum abx with IV tylenol being given. 





CXR shows new onset PNA.





Attending admitted pt to ICU for further monitoring. 





<Zaid Turner - Last Filed: 01/15/18 03:31>





*DC/Admit/Observation/Transfer





- Discharge Dispostion


Admit: Yes





<Destiny Whitaker - Last Filed: 01/15/18 02:20>





<Zaid Turner - Last Filed: 01/15/18 03:31>


Diagnosis at time of Disposition: 


 Seizure, Influenza A, Altered mental status, Fever, Rhonchi, Pneumonia








- Discharge Dispostion


Condition at time of disposition: Guarded

## 2018-01-15 LAB
ALBUMIN SERPL-MCNC: 3.7 G/DL (ref 3.4–5)
ALP SERPL-CCNC: 69 U/L (ref 45–117)
ALT SERPL-CCNC: 59 U/L (ref 12–78)
ANION GAP SERPL CALC-SCNC: 11 MMOL/L (ref 8–16)
ANION GAP SERPL CALC-SCNC: 9 MMOL/L (ref 8–16)
APPEARANCE UR: CLEAR
AST SERPL-CCNC: 62 U/L (ref 15–37)
BASOPHILS # BLD: 1.2 % (ref 0–2)
BASOPHILS # BLD: 1.3 % (ref 0–2)
BILIRUB SERPL-MCNC: 0.9 MG/DL (ref 0.2–1)
BILIRUB UR STRIP.AUTO-MCNC: NEGATIVE MG/DL
BUN SERPL-MCNC: 6 MG/DL (ref 7–18)
BUN SERPL-MCNC: 7 MG/DL (ref 7–18)
CALCIUM SERPL-MCNC: 8.4 MG/DL (ref 8.5–10.1)
CALCIUM SERPL-MCNC: 8.9 MG/DL (ref 8.5–10.1)
CHLORIDE SERPL-SCNC: 104 MMOL/L (ref 98–107)
CHLORIDE SERPL-SCNC: 107 MMOL/L (ref 98–107)
CHOLEST SERPL-MCNC: 181 MG/DL (ref 50–200)
CO2 SERPL-SCNC: 23 MMOL/L (ref 21–32)
CO2 SERPL-SCNC: 27 MMOL/L (ref 21–32)
COLOR UR: YELLOW
CREAT SERPL-MCNC: 1 MG/DL (ref 0.55–1.02)
CREAT SERPL-MCNC: 1.2 MG/DL (ref 0.55–1.02)
DEPRECATED RDW RBC AUTO: 14.9 % (ref 11.6–15.6)
DEPRECATED RDW RBC AUTO: 14.9 % (ref 11.6–15.6)
EOSINOPHIL # BLD: 1.4 % (ref 0–4.5)
EOSINOPHIL # BLD: 1.4 % (ref 0–4.5)
EPITH CASTS URNS QL MICRO: (no result) /HPF
GLUCOSE SERPL-MCNC: 103 MG/DL (ref 74–106)
GLUCOSE SERPL-MCNC: 137 MG/DL (ref 74–106)
HCT VFR BLD CALC: 38.9 % (ref 32.4–45.2)
HCT VFR BLD CALC: 41.5 % (ref 32.4–45.2)
HDLC SERPL-MCNC: 43 MG/DL (ref 40–60)
HGB BLD-MCNC: 12.7 GM/DL (ref 10.7–15.3)
HGB BLD-MCNC: 13.9 GM/DL (ref 10.7–15.3)
INR BLD: 1.16 (ref 0.82–1.09)
KETONES UR QL STRIP: NEGATIVE
LDLC SERPL CALC-MCNC: 128 MG/DL (ref 5–100)
LEUKOCYTE ESTERASE UR QL STRIP.AUTO: NEGATIVE
LYMPHOCYTES # BLD: 21.4 % (ref 8–40)
LYMPHOCYTES # BLD: 23.3 % (ref 8–40)
MAGNESIUM SERPL-MCNC: 1.1 MG/DL (ref 1.8–2.4)
MCH RBC QN AUTO: 28.9 PG (ref 25.7–33.7)
MCH RBC QN AUTO: 29.7 PG (ref 25.7–33.7)
MCHC RBC AUTO-ENTMCNC: 32.6 G/DL (ref 32–36)
MCHC RBC AUTO-ENTMCNC: 33.4 G/DL (ref 32–36)
MCV RBC: 88.6 FL (ref 80–96)
MCV RBC: 88.7 FL (ref 80–96)
MONOCYTES # BLD AUTO: 13.3 % (ref 3.8–10.2)
MONOCYTES # BLD AUTO: 15.8 % (ref 3.8–10.2)
MUCOUS THREADS URNS QL MICRO: (no result)
NEUTROPHILS # BLD: 58.3 % (ref 42.8–82.8)
NEUTROPHILS # BLD: 62.6 % (ref 42.8–82.8)
NITRITE UR QL STRIP: NEGATIVE
PH BLDV: 7.36 [PH] (ref 7.32–7.42)
PH UR: 5 [PH] (ref 5–8)
PHOSPHATE SERPL-MCNC: 3.2 MG/DL (ref 2.5–4.9)
PLATELET # BLD AUTO: 218 K/MM3 (ref 134–434)
PLATELET # BLD AUTO: 245 K/MM3 (ref 134–434)
PMV BLD: 10 FL (ref 7.5–11.1)
PMV BLD: 9.7 FL (ref 7.5–11.1)
POTASSIUM SERPLBLD-SCNC: 3.4 MMOL/L (ref 3.5–5.1)
POTASSIUM SERPLBLD-SCNC: 3.5 MMOL/L (ref 3.5–5.1)
PROT SERPL-MCNC: 7.2 G/DL (ref 6.4–8.2)
PROT UR QL STRIP: NEGATIVE
PROT UR QL STRIP: NEGATIVE
PT PNL PPP: 13.1 SEC (ref 9.98–11.88)
RBC # BLD AUTO: 4.39 M/MM3 (ref 3.6–5.2)
RBC # BLD AUTO: 4.68 M/MM3 (ref 3.6–5.2)
RBC # UR STRIP: (no result) /UL
SODIUM SERPL-SCNC: 140 MMOL/L (ref 136–145)
SODIUM SERPL-SCNC: 141 MMOL/L (ref 136–145)
SP GR UR: 1.02 (ref 1–1.03)
TRIGL SERPL-MCNC: 135 MG/DL (ref 35–160)
UROBILINOGEN UR STRIP-MCNC: NEGATIVE MG/DL (ref 0.2–1)
VENOUS PC02: 44.3 MMHG (ref 38–52)
VENOUS PO2: 35.2 MMHG (ref 28–48)
WBC # BLD AUTO: 4.8 K/MM3 (ref 4–10)
WBC # BLD AUTO: 7 K/MM3 (ref 4–10)

## 2018-01-15 RX ADMIN — SODIUM CHLORIDE SCH MLS/HR: 9 INJECTION, SOLUTION INTRAVENOUS at 04:38

## 2018-01-15 RX ADMIN — INSULIN ASPART SCH: 100 INJECTION, SOLUTION INTRAVENOUS; SUBCUTANEOUS at 06:28

## 2018-01-15 RX ADMIN — OSELTAMIVIR PHOSPHATE SCH MG: 75 CAPSULE ORAL at 22:09

## 2018-01-15 RX ADMIN — MUPIROCIN SCH APPLIC: 20 OINTMENT TOPICAL at 22:07

## 2018-01-15 RX ADMIN — HEPARIN SODIUM SCH UNIT: 5000 INJECTION, SOLUTION INTRAVENOUS; SUBCUTANEOUS at 13:35

## 2018-01-15 RX ADMIN — INSULIN ASPART SCH: 100 INJECTION, SOLUTION INTRAVENOUS; SUBCUTANEOUS at 17:38

## 2018-01-15 RX ADMIN — LEVOTHYROXINE SODIUM SCH MCG: 75 TABLET ORAL at 06:28

## 2018-01-15 RX ADMIN — OSELTAMIVIR PHOSPHATE SCH MG: 30 CAPSULE ORAL at 09:31

## 2018-01-15 RX ADMIN — MUPIROCIN SCH APPLIC: 20 OINTMENT TOPICAL at 09:34

## 2018-01-15 RX ADMIN — LEVETIRACETAM SCH MG: 250 TABLET, FILM COATED ORAL at 22:09

## 2018-01-15 RX ADMIN — HEPARIN SODIUM SCH UNIT: 5000 INJECTION, SOLUTION INTRAVENOUS; SUBCUTANEOUS at 06:28

## 2018-01-15 RX ADMIN — INSULIN ASPART SCH: 100 INJECTION, SOLUTION INTRAVENOUS; SUBCUTANEOUS at 12:58

## 2018-01-15 RX ADMIN — METOPROLOL TARTRATE SCH MG: 50 TABLET, FILM COATED ORAL at 22:08

## 2018-01-15 RX ADMIN — HEPARIN SODIUM SCH UNIT: 5000 INJECTION, SOLUTION INTRAVENOUS; SUBCUTANEOUS at 22:07

## 2018-01-15 RX ADMIN — METOPROLOL TARTRATE SCH MG: 50 TABLET, FILM COATED ORAL at 09:33

## 2018-01-15 RX ADMIN — OSELTAMIVIR PHOSPHATE SCH MG: 30 CAPSULE ORAL at 04:37

## 2018-01-15 NOTE — PN
Progress Note, Physician


Chief Complaint: 





Patient feels comfortable , more alert still minimal confusion, denies any head 

ache





- Current Medication List


Current Medications: 


Active Medications





Acetaminophen (Tylenol -)  650 mg PO Q6H PRN


   PRN Reason: FEVER


Amlodipine Besylate (Norvasc -)  5 mg PO DAILY [facility]


   Last Admin: 01/15/18 09:33 Dose:  5 mg


Chlorhexidine Gluconate (Hibiclens For Decolonization -)  1 applic TP HS [facility]


Cholecalciferol (Vitamin D3 -)  2,000 unit PO DAILY [facility]


   Last Admin: 01/15/18 09:33 Dose:  2,000 unit


Heparin Sodium (Porcine) (Heparin -)  5,000 unit SQ TID [facility]


   Last Admin: 01/15/18 06:28 Dose:  5,000 unit


Sodium Chloride (Normal Saline -)  1,000 mls @ 75 mls/hr IV ASDIR [facility]


   Last Admin: 01/15/18 04:38 Dose:  75 mls/hr


Azithromycin 500 mg/ Dextrose  250 mls @ 250 mls/hr IVPB DAILY [facility]


   Last Admin: 01/15/18 09:32 Dose:  250 mls/hr


CEFTRIAXONE 1 G/50 ML PREMIX (Ceftriaxone 1 Gm-D5w Bag)  50 mls @ 100 mls/hr 

IVPB DAILY [facility]


   Last Admin: 01/15/18 09:33 Dose:  100 mls/hr


Insulin Aspart (Novolog Vial Sliding Scale -)  1 vial SQ TIDAC [facility]


   PRN Reason: Protocol


   Last Admin: 01/15/18 12:58 Dose:  Not Given


Insulin Aspart (Novolog Vial Sliding Scale -)  1 vial SQ [facility]


   PRN Reason: Protocol


Levetiracetam (Keppra -)  250 mg PO BID [facility]


   Last Admin: 01/15/18 09:31 Dose:  250 mg


Levothyroxine Sodium (Synthroid -)  75 mcg PO DAILY@0700 [facility]


   Last Admin: 01/15/18 06:28 Dose:  75 mcg


Metoprolol Tartrate (Lopressor -)  50 mg PO BID [facility]


   Last Admin: 01/15/18 09:33 Dose:  50 mg


Mupirocin (Bactroban Ointment (For Decolonization) -)  1 applic NS BID [facility]


   Stop: 01/20/18 09:59


   Last Admin: 01/15/18 09:34 Dose:  1 applic


Oseltamivir Phosphate (Tamiflu -)  30 mg PO BID [facility]


   Stop: 01/20/18 03:44


   Last Admin: 01/15/18 09:31 Dose:  30 mg


Pantoprazole Sodium (Protonix -)  40 mg PO DAILY [facility]


   Last Admin: 01/15/18 09:33 Dose:  40 mg











- Objective


Vital Signs: 


 Vital Signs











Temperature  99.1 F   01/15/18 06:59


 


Pulse Rate  64   01/15/18 09:00


 


Respiratory Rate  22   01/15/18 09:00


 


Blood Pressure  148/73   01/15/18 09:00


 


O2 Sat by Pulse Oximetry (%)  98   01/15/18 09:00














Middle aged F not in distress





HEENT: Mm moist anemia, PERRLA, EOMI





NECK; No JVD No Bruit





CHEST: CTA B/L





CVS: S1S2 R





ABD: No distention non tender Bs +





EXT: Miguel Ángel afeet





CNS: alert but lethargic and confused ortherwise non focal








Labs: 


 CBC, BMP





 01/15/18 05:20 





 01/15/18 05:20 





 INR, PTT











INR  1.16  (0.82-1.09)  H  01/14/18  23:55    














Problem List





- Problems


(1) Altered mental status


Assessment/Plan: 


Most likely brakthrough seizures cont  keppra f/u Neurology consult input


Code(s): R41.82 - ALTERED MENTAL STATUS, UNSPECIFIED   





(2) Influenza A


Assessment/Plan: 


On tamiflu


Code(s): J10.1 - FLU DUE TO OTH IDENT INFLUENZA VIRUS W OTH RESP MANIFEST   





(3) Pneumonia


Assessment/Plan: 


CABP  on Ceftraixone 


Code(s): J18.9 - PNEUMONIA, UNSPECIFIED ORGANISM   





(4) Seizure


Assessment/Plan: 


Known case of seizures admitted with winessed seizure by EMs responded to Versed


Code(s): R56.9 - UNSPECIFIED CONVULSIONS   





(5) Pituitary adenoma


Assessment/Plan: 


S/P surgery in 10/17 F/U Neurology recommondations


Code(s): D35.2 - BENIGN NEOPLASM OF PITUITARY GLAND   





(6) Elevated troponin I level


Assessment/Plan: 


Most likely Demand ischemia F/U ECHO serial trop I, Cont ASA  F/U HbA!c Lipid 

panel and TSH no acute St T chnages no c/o chest pain 


Code(s): R74.8 - ABNORMAL LEVELS OF OTHER SERUM ENZYMES   





(7) Dehydration


Assessment/Plan: 


Cont IV hydration


Code(s): E86.0 - DEHYDRATION   





(8) T2DM (type 2 diabetes mellitus)


Assessment/Plan: 


Cont Correction dose insulin


Code(s): E11.9 - TYPE 2 DIABETES MELLITUS WITHOUT COMPLICATIONS

## 2018-01-15 NOTE — PN
Progress Note (short form)





- Note


Progress Note: 





ID Consult dictated


Acute influenza A


S/p seizure


Possible aspiration pneumonia


S/P resection, pituitary tumor





Obtain c/s


Tamiflu/ Droplet precautions


Empiric ceftriaxone for possible aspiration pneumonia

## 2018-01-15 NOTE — CON.NEURO
Consult





- Past Medical History


CNS: Yes: Other (Brain Surgery (s/p ptuitary tumor resection 10/2017).)


Cardio/Vascular: Yes: HTN


Gastrointestinal: Yes: GERD


Hepatobiliary: No: Cirrhosis


Renal/: No: Renal Failure


...Pregnant: No


Infectious Disease: No: AIDS


Psych: No: Addictions, Anxiety


Endocrine: Yes: Diabetes Mellitus





- Past Surgical History


Additional Surgical History: (s/p pituitary resection 10/2017)





- Alcohol/Substance Use


Hx Alcohol Use: No





- Smoking History


Smoking history: Unknown if ever smoked


Have you smoked in the past 12 months: No





- Social History


History of Recent Travel: No





Home Medications





- Allergies


Allergies/Adverse Reactions: 


 Allergies











Allergy/AdvReac Type Severity Reaction Status Date / Time


 


No Known Allergies Allergy   Verified 01/14/18 22:46














- Home Medications


Home Medications: 


Ambulatory Orders





Amlodipine Besylate 5 mg PO DAILY 12/26/17 


Cholecalciferol (Vitamin D3) [Vitamin D3] 2,000 unit PO DAILY 12/26/17 


Levothyroxine Sodium [Levo-T] 75 mcg PO DAILY 12/26/17 


Metformin HCl 500 mg PO DAILY #30 tablet 12/26/17 


Metoprolol Tartrate 50 mg PO BID 12/26/17 


Pantoprazole Sodium 40 mg PO DAILY 12/26/17 











Physical Exam-Neuro


Vital Signs: 


 Vital Signs











Temperature  99.1 F   01/15/18 06:59


 


Pulse Rate  64   01/15/18 09:00


 


Respiratory Rate  22   01/15/18 09:00


 


Blood Pressure  148/73   01/15/18 09:00


 


O2 Sat by Pulse Oximetry (%)  98   01/15/18 09:00











Labs: 


 CBC, BMP





 01/15/18 05:20 





 01/15/18 05:20 





 INR, PTT











INR  1.16  (0.82-1.09)  H  01/14/18  23:55    














Assessment/Plan


cc Brought to hospital for [facility] and on way to hospital she had seizrues





HPI 67 year old female hsitory of Pituitary tumor s/p resection in october 2017 

at [facility]. Patient is now back to normal. Initially 

suspected to have pneumonia, She is being treated with azithromycin and 

Tamiflu. Patient had ct scan of brain showed there is enlargement of Pituitary 

area. 


Patient also have history of htn, dm, seizure disorder, hypothyrodism and she 

was on hydrocortisone


PMH as above.


FH,ROS SH reviewed in chart


HOME MEDICATIONS:


 3





 Medication  Instructions  Recorded


 


Amlodipine Besylate 5 mg PO DAILY 12/26/17


 


Cholecalciferol (Vitamin D3) 2,000 unit PO DAILY 12/26/17





[Vitamin D3]  


 


Levothyroxine Sodium [Levo-T] 75 mcg PO DAILY 12/26/17


 


Metformin HCl 500 mg PO DAILY #30 tablet 12/26/17


 


Metoprolol Tartrate 50 mg PO BID 12/26/17


 


Pantoprazole Sodium 40 mg PO DAILY 12/26/17


 


humalog  as per EMS


 


lantus  as per EMS


 


keppra  as per EMS











Neurological Examination


Alert oriented x 3, speech is normal able to follow command


EOMI , vf is normal by confrontation and pupils is reactive


No facial asymmetry, 


moving all extremity and sensation is normal





ct scan findings reviewed ( prior to current ct scan , mri of brain was done 

2013),








Assessment/PLAN:


1.Breakthrough seizures , she was on keppra 250 mg po bid, it is quite a low 

dose, suggest to increase to 750 mg po bid


  -- Increase keppra 750 mg po bid


-- EEG


2. Worsening of Pituitary tumor? less likley as she recently had surgery done 

and 


- suggest to do mri of brain with contrast 


- our Neurosurgery consult


-may need further input from Endocrinologist and Optho , and Patient may be 

better off following at [facility] , if nothing urgent 

identified. 





Thank you so much





Froilan Mathew MD

## 2018-01-15 NOTE — PN
Physical Exam: 


SUBJECTIVE: Patient seen and examined


The patient is a 67 year old female with a history of HTN, DM, HLD, pituitary 

tumor s/p resection in 10/17 who presented for lethargy and AMS.  Per EMS the 

patient had a seizure or seizure like activity en route to the ED and was given 

5mg of midazolam at the time.  She currently reports no complaints and states 

that her surgery was done at [facility] by a Dr. Miller.  She states 

that she has never had seizures in the past and has never been on medication 

for seizures.  She reports that she feels otherwise well.





OBJECTIVE:





 Vital Signs











 Period  Temp  Pulse  Resp  BP Sys/Weston  Pulse Ox


 


 Last 24 Hr  99.1 F-103.9 F  64-92  16-35  103-148/63-92  93-98











GENERAL: The patient is awake, alert, and fully oriented, in no acute distress.


HEAD: Normal with no signs of trauma.


EYES:  sclera anicteric, conjunctiva clear. No ptosis. 


ENT:  oropharynx clear without exudates, moist mucous 


membranes.


NECK: Trachea midline, full range of motion, supple. 


LUNGS: Breath sounds equal, clear to auscultation bilaterally, no wheezes, no 

crackles, no 


accessory muscle use. 


HEART: Regular rate and rhythm, S1, S2 without murmur, rub or gallop.


ABDOMEN: Soft, nontender, nondistended, normoactive bowel sounds, no guarding, 

no 


rebound, no hepatosplenomegaly, no masses.


EXTREMITIES: 2+ pulses, warm, well-perfused, no edema. 


NEUROLOGICAL: Normal speech, gait not 


observed.


PSYCH: Normal mood, normal affect.


SKIN: Warm, dry, normal turgor, no rashes or lesions noted














 Laboratory Results - last 24 hr











  01/14/18 01/14/18 01/14/18





  23:55 23:55 23:55


 


WBC  7.0  


 


RBC  4.68  


 


Hgb  13.9  


 


Hct  41.5  


 


MCV  88.7  


 


MCH  29.7  


 


MCHC  33.4  


 


RDW  14.9  


 


Plt Count  245  D  


 


MPV  9.7  


 


Neutrophils %  62.6  


 


Lymphocytes %  21.4  D  


 


Monocytes %  13.3 H  


 


Eosinophils %  1.4  


 


Basophils %  1.3  


 


PT with INR   13.10 H 


 


INR   1.16 H 


 


PTT (Actin FS)   


 


VBG pH   


 


POC VBG pCO2   


 


POC VBG pO2   


 


Mixed VBG HCO3   


 


Sodium    140


 


Potassium    3.5


 


Chloride    104


 


Carbon Dioxide    27


 


Anion Gap    9


 


BUN    6 L


 


Creatinine    1.2 H


 


Creat Clearance w eGFR    45.09


 


POC Glucometer   


 


Random Glucose    137 H


 


Lactic Acid   


 


Calcium    8.9


 


Phosphorus   


 


Magnesium   


 


Total Bilirubin    0.9  D


 


AST    62 H D


 


ALT    59  D


 


Alkaline Phosphatase    69


 


Creatine Kinase    93


 


Creatine Kinase Index   


 


CK-MB (CK-2)   


 


Troponin I    0.13 H


 


Total Protein    7.2


 


Albumin    3.7


 


Urine Color   


 


Urine Appearance   


 


Urine pH   


 


Ur Specific Gravity   


 


Urine Protein   


 


Urine Glucose (UA)   


 


Urine Ketones   


 


Urine Blood   


 


Urine Nitrite   


 


Urine Bilirubin   


 


Urine Urobilinogen   


 


Ur Leukocyte Esterase   


 


Urine WBC (Auto)   


 


Urine RBC (Auto)   


 


Ur Epithelial Cells   


 


Urine Mucus   














  01/14/18 01/15/18 01/15/18





  23:55 00:30 00:30


 


WBC   


 


RBC   


 


Hgb   


 


Hct   


 


MCV   


 


MCH   


 


MCHC   


 


RDW   


 


Plt Count   


 


MPV   


 


Neutrophils %   


 


Lymphocytes %   


 


Monocytes %   


 


Eosinophils %   


 


Basophils %   


 


PT with INR   


 


INR   


 


PTT (Actin FS)  32.8  


 


VBG pH   7.36 


 


POC VBG pCO2   44.3 


 


POC VBG pO2   35.2 


 


Mixed VBG HCO3   24.3 


 


Sodium   


 


Potassium   


 


Chloride   


 


Carbon Dioxide   


 


Anion Gap   


 


BUN   


 


Creatinine   


 


Creat Clearance w eGFR   


 


POC Glucometer   


 


Random Glucose   


 


Lactic Acid    1.5


 


Calcium   


 


Phosphorus   


 


Magnesium   


 


Total Bilirubin   


 


AST   


 


ALT   


 


Alkaline Phosphatase   


 


Creatine Kinase   


 


Creatine Kinase Index   


 


CK-MB (CK-2)   


 


Troponin I   


 


Total Protein   


 


Albumin   


 


Urine Color   


 


Urine Appearance   


 


Urine pH   


 


Ur Specific Gravity   


 


Urine Protein   


 


Urine Glucose (UA)   


 


Urine Ketones   


 


Urine Blood   


 


Urine Nitrite   


 


Urine Bilirubin   


 


Urine Urobilinogen   


 


Ur Leukocyte Esterase   


 


Urine WBC (Auto)   


 


Urine RBC (Auto)   


 


Ur Epithelial Cells   


 


Urine Mucus   














  01/15/18 01/15/18 01/15/18





  01:22 05:20 05:20


 


WBC   4.8  D 


 


RBC   4.39 


 


Hgb   12.7 


 


Hct   38.9 


 


MCV   88.6 


 


MCH   28.9 


 


MCHC   32.6 


 


RDW   14.9 


 


Plt Count   218 


 


MPV   10.0 


 


Neutrophils %   58.3 


 


Lymphocytes %   23.3 


 


Monocytes %   15.8 H 


 


Eosinophils %   1.4 


 


Basophils %   1.2 


 


PT with INR   


 


INR   


 


PTT (Actin FS)   


 


VBG pH   


 


POC VBG pCO2   


 


POC VBG pO2   


 


Mixed VBG HCO3   


 


Sodium    141


 


Potassium    3.4 L


 


Chloride    107


 


Carbon Dioxide    23


 


Anion Gap    11


 


BUN    7


 


Creatinine    1.0


 


Creat Clearance w eGFR   


 


POC Glucometer   


 


Random Glucose    103


 


Lactic Acid   


 


Calcium    8.4 L


 


Phosphorus    3.2


 


Magnesium    1.1 L D


 


Total Bilirubin   


 


AST   


 


ALT   


 


Alkaline Phosphatase   


 


Creatine Kinase    215 H


 


Creatine Kinase Index    0.4


 


CK-MB (CK-2)    < 1.000


 


Troponin I    0.13 H


 


Total Protein   


 


Albumin   


 


Urine Color  Yellow  


 


Urine Appearance  Clear  


 


Urine pH  5.0  


 


Ur Specific Gravity  1.017  


 


Urine Protein  Negative  


 


Urine Glucose (UA)  Negative  


 


Urine Ketones  Negative  


 


Urine Blood  1+ H  


 


Urine Nitrite  Negative  


 


Urine Bilirubin  Negative  


 


Urine Urobilinogen  Negative  


 


Ur Leukocyte Esterase  Negative  


 


Urine WBC (Auto)  1  


 


Urine RBC (Auto)  2  


 


Ur Epithelial Cells  Rare  


 


Urine Mucus  Rare  














  01/15/18





  06:23


 


WBC 


 


RBC 


 


Hgb 


 


Hct 


 


MCV 


 


MCH 


 


MCHC 


 


RDW 


 


Plt Count 


 


MPV 


 


Neutrophils % 


 


Lymphocytes % 


 


Monocytes % 


 


Eosinophils % 


 


Basophils % 


 


PT with INR 


 


INR 


 


PTT (Actin FS) 


 


VBG pH 


 


POC VBG pCO2 


 


POC VBG pO2 


 


Mixed VBG HCO3 


 


Sodium 


 


Potassium 


 


Chloride 


 


Carbon Dioxide 


 


Anion Gap 


 


BUN 


 


Creatinine 


 


Creat Clearance w eGFR 


 


POC Glucometer  [id number]


 


Random Glucose 


 


Lactic Acid 


 


Calcium 


 


Phosphorus 


 


Magnesium 


 


Total Bilirubin 


 


AST 


 


ALT 


 


Alkaline Phosphatase 


 


Creatine Kinase 


 


Creatine Kinase Index 


 


CK-MB (CK-2) 


 


Troponin I 


 


Total Protein 


 


Albumin 


 


Urine Color 


 


Urine Appearance 


 


Urine pH 


 


Ur Specific Gravity 


 


Urine Protein 


 


Urine Glucose (UA) 


 


Urine Ketones 


 


Urine Blood 


 


Urine Nitrite 


 


Urine Bilirubin 


 


Urine Urobilinogen 


 


Ur Leukocyte Esterase 


 


Urine WBC (Auto) 


 


Urine RBC (Auto) 


 


Ur Epithelial Cells 


 


Urine Mucus 








Active Medications











Generic Name Dose Route Start Last Admin





  Trade Name Freq  PRN Reason Stop Dose Admin


 


Acetaminophen  650 mg  01/15/18 03:24  





  Tylenol -  PO   





  Q6H PRN   





  FEVER   


 


Amlodipine Besylate  5 mg  01/15/18 10:00  01/15/18 09:33





  Norvasc -  PO   5 mg





  DAILY VERONIKA   Administration


 


Chlorhexidine Gluconate  1 applic  01/15/18 22:00  





  Hibiclens For Decolonization -  TP   





  HS VERONIKA   


 


Cholecalciferol  2,000 unit  01/15/18 10:00  01/15/18 09:33





  Vitamin D3 -  PO   2,000 unit





  DAILY VERONIKA   Administration


 


Heparin Sodium (Porcine)  5,000 unit  01/15/18 06:00  01/15/18 06:28





  Heparin -  SQ   5,000 unit





  TID VERONIKA   Administration


 


Sodium Chloride  1,000 mls @ 75 mls/hr  01/15/18 04:15  01/15/18 04:38





  Normal Saline -  IV   75 mls/hr





  ASDIR VERONIKA   Administration


 


Azithromycin 500 mg/ Dextrose  250 mls @ 250 mls/hr  01/15/18 10:00  01/15/18 09

:32





  IVPB   250 mls/hr





  DAILY VERONIKA   Administration


 


CEFTRIAXONE 1 G/50 ML PREMIX  50 mls @ 100 mls/hr  01/15/18 10:00  01/15/18 09:

33





  Ceftriaxone 1 Gm-D5w Bag  IVPB   100 mls/hr





  DAILY VERONIKA   Administration


 


Insulin Aspart  1 vial  01/15/18 07:00  01/15/18 12:58





  Novolog Vial Sliding Scale -  SQ   Not Given





  TIDAC [facility]   





  Protocol   


 


Insulin Aspart  1 vial  01/15/18 22:00  





  Novolog Vial Sliding Scale -  SQ   





  HS [facility]   





  Protocol   


 


Levetiracetam  250 mg  01/15/18 10:00  01/15/18 09:31





  Keppra -  PO   250 mg





  BID VERONIKA   Administration


 


Levothyroxine Sodium  75 mcg  01/15/18 07:00  01/15/18 06:28





  Synthroid -  PO   75 mcg





  DAILY@0700 VERONIKA   Administration


 


Metoprolol Tartrate  50 mg  01/15/18 10:00  01/15/18 09:33





  Lopressor -  PO   50 mg





  BID VERONIKA   Administration


 


Mupirocin  1 applic  01/15/18 10:00  01/15/18 09:34





  Bactroban Ointment (For Decolonization) -  NS  01/20/18 09:59  1 applic





  BID VERONIKA   Administration


 


Oseltamivir Phosphate  30 mg  01/15/18 03:45  01/15/18 09:31





  Tamiflu -  PO  01/20/18 03:44  30 mg





  BID VERONIKA   Administration


 


Pantoprazole Sodium  40 mg  01/15/18 10:00  01/15/18 09:33





  Protonix -  PO   40 mg





  DAILY VERONIKA   Administration











ASSESSMENT/PLAN:


The patient is a 67 year old female with a history of HTN, DM, HLD, pituitary 

tumor s/p resection in 10/17 who presented for lethargy and AMS treated with 

5mg of midazolam for seizure like activity en rout to the ED found to be 

influenza A positive with a concern for a pneumonia on chest plain film.








NEURO


#Seizure


-Continue keppra 250mg BID


-No seizure activity since presentation





CV


#HTN


-Continue home medications


-Continue to monitor vitals





RESP


Improving infiltrate on plain film today.  Patient denies any symptoms 

currently.


-Continue treatment with Tamiflu for influenza.


-Continue treatment with azithromycin and ceftriaxone for possible pneumonia.





GI


No issues currently.





Heme


No Issues currently.





Renal


-Good GFR.


-Will continue to monitor.





MSK


No issues currently





FEN/GI


-Replete electrolytes PRN, will monitor








PPX


-Continue protonix 40mg daily.


-Continue SCDs





DISPO: Stable for med/surg transfer.








Visit type





- Emergency Visit


Emergency Visit: No





- New Patient


This patient is new to me today: Yes


Date on this admission: 01/15/18





- Critical Care


Critical Care patient: No

## 2018-01-15 NOTE — CONSULT
Consult


Consult Specialty:: PULM/CCM


Referred by:: 


Reason for Consultation:: AMS





- History of Present Illness


Chief Complaint: AMS


History of Present Illness: 


Ms. Farnsworth is a 66 y/o woman w/ HTN, DM, Sz disorder, hypothyroid, & a 

pituitary tumor (s/p resection 10/2017), BIBA O/N for AMS. (Of note, EMS adm IV 

Versed X 5mg enroute for reported Sz activity). In ED, pt remark for Fever > 103

, lethargy/delerium, a troponin leak --> 0.13, Flu +, LLL opacity, & a NCHCT c/

f progression of Brain CA (2.9 cm suprasellar mass c/f neoplasm). Of note, pt 

endorses that she did NOT secure her flu or PNA vaccine this season.  Pt adm to 

the ICU for AMS.





- History Source


History Provided By: Patient, Medical Record


Limitations to Obtaining History: Poor Historian





- Past Medical History


CNS: Yes: Other (Brain Surgery (s/p ptuitary tumor resection 10/2017).)


Cardio/Vascular: Yes: HTN


Gastrointestinal: Yes: GERD


Hepatobiliary: No: Cirrhosis


Renal/: No: Renal Failure


...Pregnant: No


Heme/Onc: Yes: Cancer


Infectious Disease: No: AIDS


Psych: No: Addictions, Anxiety


Endocrine: Yes: Diabetes Mellitus





- Past Surgical History


Additional Surgical History: (s/p pituitary resection 10/2017)





- Alcohol/Substance Use


Hx Alcohol Use: No





- Smoking History


Smoking history: Unknown if ever smoked


Have you smoked in the past 12 months: No





- Social History


History of Recent Travel: No





Home Medications





- Allergies


Allergies/Adverse Reactions: 


 Allergies











Allergy/AdvReac Type Severity Reaction Status Date / Time


 


No Known Allergies Allergy   Verified 01/14/18 22:46














- Home Medications


Home Medications: 


Ambulatory Orders





Amlodipine Besylate 5 mg PO DAILY 12/26/17 


Cholecalciferol (Vitamin D3) [Vitamin D3] 2,000 unit PO DAILY 12/26/17 


Levothyroxine Sodium [Levo-T] 75 mcg PO DAILY 12/26/17 


Metformin HCl 500 mg PO DAILY #30 tablet 12/26/17 


Metoprolol Tartrate 50 mg PO BID 12/26/17 


Pantoprazole Sodium 40 mg PO DAILY 12/26/17 











Family Disease History





- Family Disease History


Family History: Unable to Obtain (AMS)





Review of Systems


Unable to obtain ROS, reason: AMS





Physical Exam


Vital Signs: 


 Vital Signs











Temperature  99.8 F H  01/15/18 02:25


 


Pulse Rate  90   01/15/18 03:24


 


Respiratory Rate  35 H  01/15/18 03:24


 


Blood Pressure  103/68   01/15/18 03:24


 


O2 Sat by Pulse Oximetry (%)  98   01/15/18 02:25











Constitutional: Yes: Well Nourished, No Distress, Calm


Eyes: Yes: WNL, Conjunctiva Clear, EOM Intact, Other (DRY MMM)


HENT: Yes: WNL, Atraumatic, Normocephalic


Neck: Yes: WNL, Supple, Trachea Midline


Cardiovascular: Yes: WNL, Regular Rate and Rhythm


Respiratory: Yes: WNL, Rhonchi


Gastrointestinal: Yes: WNL, Normal Bowel Sounds, Soft, Abdomen, Obese


...Rectal Exam: Yes: Deferred


Renal/: Yes: WNL


Breast(s): Yes: WNL


Musculoskeletal: Yes: WNL


Extremities: Yes: WNL


Edema: No


Peripheral Pulses WNL: Yes


Integumentary: Yes: Other (Dry, ASHY.)


Neurological: Yes: Lethargy


...Motor Strength: WNL


Psychiatric: Yes: WNL, Oriented


Labs: 


 CBC, BMP





 01/14/18 23:55 





 01/14/18 23:55 











Imaging





- Results


Chest X-ray: Image Reviewed (1/15: LLL PNA (My Read).)


[facility]: [facility]  The ventricular system is midline and 

nondilated. The sulcal pattern is normal for the patient's age. Mild small 

vessel ischemic changes are noted. There is a 2.9 x 2.9 cm suprasellar mass 

with minimal calcification. Neoplasm and aneurysm are both considered. There is 

no bleed, extra-axial fluid collection or mass effect. No skull fracture or 

skull lesion is identified. Diffuse sinus opacification consistent with 

sinusitis. The bilateral mastoid air cells are clear. IMPRESSION: 2.9 cm 

suprasellar mass may indicate neoplasm or aneurysm and should be followed up 

with enhanced MRI/MRA or enhanced CT/CTA. Diffuse sinusitis.)


MRI: Report Reviewed (MRI BRAIN 10/13: Chronic microvascular ischemic changes. 

No acute infarct or intracranial hemorrhage  are identified.     Large 

intrasellar and suprasellar lobulated mass with enhancement extending into the 

cavernous  carotid sinuses, left more than right with enlargement of the sella. 

Its superior margin is  extending superiorly/posteriorly effacing and 

displacing the optic chiasm superiorly with  significant mass effect as well as 

splaying of the  posterior aspect of the optic nerves prior to  the chiasm.  

Its inferior margin appears to be extending into the sphenoid sinus likely due 

to  chronic mass effect and remodeling of bone.)


EKG: Image Reviewed (12-LEAD 1/14: RSR in the 90's w/o ectopy. L atrial 

enlargement, flipped T's in Lead I & AVL, as well as flattened T-waves in II, V5

, V6, QTc = 400ms, NSTEMI (My Read).)





Problem List





- Problems


(1) Altered mental status


Code(s): R41.82 - ALTERED MENTAL STATUS, UNSPECIFIED   





(2) Fever


Code(s): R50.9 - FEVER, UNSPECIFIED   





(3) Influenza A


Code(s): J10.1 - FLU DUE TO OTH IDENT INFLUENZA VIRUS W OTH RESP MANIFEST   





(4) Pneumonia


Code(s): J18.9 - PNEUMONIA, UNSPECIFIED ORGANISM   





(5) Seizure


Code(s): R56.9 - UNSPECIFIED CONVULSIONS   





Assessment/Plan


ASSESS: This is a 66 y/o woman w/ HTN, DM, seizure disorder, hypothyroid, & 

pituitary CA who presents now w/ Flu, fever, dehydration, delerium, Post Ictal, 

dosed w/ versed, & NSTEMI +/- CAP +/- progression of Brain CA (2.9 cm 

suprasellar mass c/f neoplasm). 





PLAN:


-Supp FiO2 prn for an SpO2 > 92%


-NEBS


-Sz precautions


-Ghazala


-Cover for CAP


-Tylenol for fever


-Rehydrate


-Strict I's & O's


-Trend BUN/Cr


-Replete e-lytes prn


-Could Consider LP, however, pt has many reasons to be lethargic/delerious


-Check TFTs


-Cont home dose Synth


-NEURO


-FSs


-Insulin prn


-Hold home anti-HTN meds for now (Pt requires major re-hydration)


-Trend Troponins


-Repeat EKG


-TTE


-CARDS


-SQH


-PPI (on pantoprazole @ home)


-SCDs





Thank you for this interesting consult





JOSH, GEM-[facility] ICU


PULM/CCM


[id number]





Critical Care Time/MDM Note


Total Critical Care Time: 39


Critical Care Statement: The care of this patient involved high complexity 

decision making to prevent further life threatening deterioration of the patient

's condition and/or to evaluate & treat vital organ system(s) failure or risk 

of failure.

## 2018-01-15 NOTE — EKG
Test Reason : 

Blood Pressure : ***/*** mmHG

Vent. Rate : 091 BPM     Atrial Rate : 091 BPM

   P-R Int : 140 ms          QRS Dur : 098 ms

    QT Int : 326 ms       P-R-T Axes : 024 -01 131 degrees

   QTc Int : 400 ms

 

BASELINE ARTIFACT

NORMAL SINUS RHYTHM

POSSIBLE LEFT ATRIAL ENLARGEMENT



ABNORMAL ECG

WHEN COMPARED WITH ECG OF 25-DEC-2017 20:59,

VENT. RATE HAS INCREASED BY  30 BPM

*** POOR DATA QUALITY, INTERPRETATION MAY BE ADVERSELY AFFECTED

Confirmed by AUBREY BELLA MD ([id number]) on 1/15/2018 10:29:46 AM

 

Referred By:             Confirmed By:AUBREY BELLA MD

## 2018-01-15 NOTE — CONS
INFECTIOUS DISEASE CONSULTATION

 

DATE OF CONSULTATION:  01/15/2018

 

REASON FOR CONSULTATION:  A 67-year-old female evaluated for acute influenza and

possible aspiration pneumonia.

 

HISTORY OF PRESENT ILLNESS:  The patient was brought to the hospital on January 14, 2018, after a 1-day history of worsening lethargy, altered mentation, and fever.  En

route to the hospital, the patient had a seizure in the ambulance.  She was evaluated

in the emergency room where she was noted to have altered mental status.  A CAT scan

of the head showed a 2.9 suprasellar mass.  Her course was complicated by fever to

103.9.  A rapid influenza swab was performed and was positive for influenza A.  At

the present time, she is awake and alert in the intensive care unit.  She has poor

recall for recent events.  She denies any pain.  She denies any complaints of chest

pain, shortness of breath, cough, or sputum production.  Denies any recent febrile

illness or shaking chills.  She is unaware of being exposed to anyone with influenza.

 She reports receiving influenza vaccine this year.

 

She denies chest pain, shortness of breath, cough, sputum production, or hemoptysis.

 

Patient has a history of pituitary tumor which was resected in October 2017, history

of diabetes mellitus, hypertension, hypothyroidism.

 

ALLERGIES:  No known allergies.

 

MEDICATIONS:  Norvasc, Keppra, Lantus, Humalog, metoprolol, Protonix, Percocet.

 

SOCIAL HISTORY:  She resides at home with family members.  She is a non-smoker,

nondrinker.  Denies risk factors for HIV.  Patient was hospitalized in late December

for hypoglycemia.

 

SYSTEMS REVIEW:

Neurologic:  Positive for pituitary tumor and now status post seizures.

Cardiac:  Negative chest pain or palpitations.

Respiratory:  As per HPI.

Gastrointestinal:  Negative vomiting or diarrhea.

Genitourinary:  Negative for urinary tract infection.

 

LABORATORY DATA:  White count 4.8, hematocrit 38.9, platelet count 218.  BUN 7,

creatinine 1.0.  Urinalysis 1 white cell.  Blood and urine cultures are pending.

 

Chest x-ray:  Possible left lower lobe infiltrate.

 

PHYSICAL EXAMINATION:

General:  She is awake and alert, not acutely toxic appearing, in no acute distress. 

Her breathing is non-labored.

Vital Signs:  Temperature 99.1, T-max 103.9; blood pressure 116/63; pulse 79,

regular; respirations 28 per minute.

HEENT:  Sclerae are anicteric.

Heart:  Sounds S1, S2.

Lungs:  Diminished breath sounds bilaterally.

Abdomen:  Obese, soft, nontender.

Extremities:  Edema 1+.

 

IMPRESSION:

1.  Acute influenza A.

2.  Status post seizures.

3.  Possible aspiration pneumonia secondary to seizure.

4.  Status post resection of pituitary tumor.

5.  Status post altered mental status, likely toxic metabolic encephalopathy.

 

RECOMMENDATIONS:  Continue droplet precautions, Tamiflu 75 mg p.o. b.i.d. for 5 days,

await culture results, empiric antibiotic coverage with ceftriaxone for possible

aspiration pneumonia, aspiration precautions.

 

Will follow.  Thank you for the kind referral.

 

 

KRYSTAL SHAH M.D.

 

[id number]

DD: 01/15/2018 15:15

DT: 01/15/2018 17:28

Job #:  [id number]

## 2018-01-15 NOTE — HP
CHIEF COMPLAINT: altered mental status, seizure activity





PCP: michelle





HISTORY OF PRESENT ILLNESS:


This is a 67 year old female with a significant past medical history of 

pituitary tumor s/p resection 10/2017 and DM who presented to the ED via EMS 

for altered mental status. EMS also reports seizure activity on the way here 

for which the patient received versed 5mg. Pt is now awake and talking but very 

slow to respond to questions. Unclear what her baseline is. No family is 

present. 





ER course was notable for:


(1) AMS, unresponsive to verbal stimuli for several hours


(2) troponin 0.13





Recent Travel:


pt denies





PAST MEDICAL HISTORY:


HTN, Diabetes, seizure disorder, hypothyroidism, pituitary tumor s/p resection


PAST SURGICAL HISTORY:


pituitary tumor resection 10/2017 (?@[facility])





Social History:


Smoking: pt denies


Alcohol: pt denies


Drugs: pt denies





Family History:


pt unable to provide answers to same





Allergies


No Known Allergies Allergy (Verified 01/14/18 22:46)


 


HOME MEDICATIONS:


 3





 Medication  Instructions  Recorded


 


Amlodipine Besylate 5 mg PO DAILY 12/26/17


 


Cholecalciferol (Vitamin D3) 2,000 unit PO DAILY 12/26/17





[Vitamin D3]  


 


Levothyroxine Sodium [Levo-T] 75 mcg PO DAILY 12/26/17


 


Metformin HCl 500 mg PO DAILY #30 tablet 12/26/17


 


Metoprolol Tartrate 50 mg PO BID 12/26/17


 


Pantoprazole Sodium 40 mg PO DAILY 12/26/17


 


humalog  as per EMS


 


lantus  as per EMS


 


keppra  as per EMS








REVIEW OF SYSTEMS


CONSTITUTIONAL: Present: fever


Absent:  chills, diaphoresis, generalized weakness, malaise, loss of appetite, 

weight change


HEENT: 


Absent:  rhinorrhea, nasal congestion, throat pain, throat swelling, difficulty 

swallowing, mouth swelling, ear pain, eye pain, visual changes


CARDIOVASCULAR: 


Absent: chest pain, syncope, palpitations, irregular heart rate, lightheadedness

, peripheral edema


RESPIRATORY: 


Absent: cough, shortness of breath, dyspnea with exertion, orthopnea, wheezing, 

stridor, hemoptysis


GASTROINTESTINAL:


Absent: abdominal pain, abdominal distension, nausea, vomiting, diarrhea, 

constipation, melena, hematochezia


GENITOURINARY: 


Absent: dysuria, frequency, urgency, hesitancy, hematuria, flank pain, genital 

pain


MUSCULOSKELETAL: 


Absent: myalgia, arthralgia, joint swelling, back pain, neck pain


SKIN: 


Absent: rash, itching, pallor


HEMATOLOGIC/IMMUNOLOGIC: 


Absent: easy bleeding, easy bruising, lymphadenopathy, frequent infections


ENDOCRINE:


Absent: unexplained weight gain, unexplained weight loss, heat intolerance, 

cold intolerance


NEUROLOGIC: Present: mental status changes


Absent: headache, focal weakness or paresthesias, dizziness, unsteady gait, 

seizure, bladder or bowel incontinence


PSYCHIATRIC: 


Absent: anxiety, depression, suicidal or homicidal ideation, hallucinations.








PHYSICAL EXAMINATION


Vital Signs - 24 hr





 3





  01/14/18 01/14/18 01/15/18





  22:46 23:03 00:01


 


Temperature  103.4 F H 103.9 F H


 


Pulse Rate 92 H  


 


Respiratory 16  





Rate   


 


Blood Pressure 148/92  


 


O2 Sat by Pulse 93 L  





Oximetry (%)   








GENERAL: Awake, alert, and oriented to person, hospital (unsure which one), and 

president, but not exact date, in no acute distress.


HEAD: Normal with no signs of trauma.


EYES: Pupils equal, round and reactive to light, extraocular movements intact, 

sclera anicteric, conjunctiva clear. No lid lag.


EARS, NOSE, THROAT: Ears normal, nares patent, oropharynx clear without 

exudates. Moist mucous membranes.


NECK: Normal range of motion, supple without lymphadenopathy, JVD, or masses.


LUNGS: Breath sounds equal, clear to auscultation bilaterally. No wheezes, and 

no crackles. No accessory muscle use.


HEART: Regular rate and rhythm, normal S1 and S2 without murmur, rub or gallop.


ABDOMEN: Soft, nontender, not distended, normoactive bowel sounds, no guarding, 

no rebound, no masses.  No hepatomegaly or  splenomegaly. 


MUSCULOSKELETAL: Normal range of motion at all joints. No bony deformities or 

tenderness. No CVA tenderness.


UPPER EXTREMITIES: 2+ pulses, warm, well-perfused. No cyanosis. No clubbing. No 

peripheral edema.


LOWER EXTREMITIES: 2+ pulses, warm, well-perfused. No calf tenderness. No 

peripheral edema. 


NEUROLOGICAL:  Cranial nerves II-XII intact. Normal speech. Normal gait.


PSYCHIATRIC: Cooperative. Good eye contact. Appropriate mood and affect.


SKIN: Warm, dry, normal turgor, no rashes or lesions noted, normal capillary 

refill. 


 


Laboratory Results - last 24 hr





 3





  01/14/18 01/14/18 01/14/18 01/15/18





  23:55 23:55 23:55 00:30


 


WBC  7.0   


 


RBC  4.68   


 


Hgb  13.9   


 


Hct  41.5   


 


MCV  88.7   


 


MCH  29.7   


 


MCHC  33.4   


 


RDW  14.9   


 


Plt Count  245  D   


 


MPV  9.7   


 


Neutrophils %  62.6   


 


Lymphocytes %  21.4  D   


 


Monocytes %  13.3 H   


 


Eosinophils %  1.4   


 


Basophils %  1.3   


 


PT with INR   13.10 H  


 


INR   1.16 H  


 


PTT (Actin FS)   32.8  


 


VBG pH     7.36


 


POC VBG pCO2     44.3


 


POC VBG pO2     35.2


 


Mixed VBG HCO3     24.3


 


Sodium    140 


 


Potassium    3.5 


 


Chloride    104 


 


Carbon Dioxide    27 


 


Anion Gap    9 


 


BUN    6 L 


 


Creatinine    1.2 H 


 


Creat Clearance w eGFR    45.09 


 


Random Glucose    137 H 


 


Lactic Acid     1.5


 


Calcium    8.9 


 


Total Bilirubin    0.9  D 


 


AST    62 H D 


 


ALT    59  D 


 


Alkaline Phosphatase    69 


 


Creatine Kinase    93 


 


Troponin I    0.13 H 


 


Total Protein    7.2 


 


Albumin    3.7 


 


Urine Color    


 


Urine Appearance    


 


Urine pH    


 


Ur Specific Gravity    


 


Urine Protein    


 


Urine Glucose (UA)    


 


Urine Ketones    


 


Urine Blood    


 


Urine Nitrite    


 


Urine Bilirubin    


 


Urine Urobilinogen    


 


Ur Leukocyte Esterase    


 


Urine WBC (Auto)    


 


Urine RBC (Auto)    


 


Ur Epithelial Cells    


 


Urine Mucus    








Urine Test Results





 3





Urine Color  Yellow   01/15/18  01:22    


 


Urine Appearance  Clear   01/15/18  01:22    


 


Urine pH  5.0  (5.0-8.0)   01/15/18  01:22    


 


Ur Specific Gravity  1.017  (1.001-1.035)   01/15/18  01:22    


 


Urine Protein  Negative  (NEGATIVE)   01/15/18  01:22    


 


Urine Glucose (UA)  Negative  (NEGATIVE)   01/15/18  01:22    


 


Urine Ketones  Negative  (NEGATIVE)   01/15/18  01:22    


 


Urine Blood  1+  (NEGATIVE)  H  01/15/18  01:22    


 


Urine Nitrite  Negative  (NEGATIVE)   01/15/18  01:22    


 


Urine Bilirubin  Negative  (NEGATIVE)   01/15/18  01:22    


 


Ur Leukocyte Esterase  Negative  (NEGATIVE)   01/15/18  01:22    


 


Urine WBC (Auto)  1 01/15/18  01:22


 


Urine RBC (Auto)  2 01/15/18  01:22


 


Ur Epithelial Cells  Rare /HPF (FEW)   01/15/18  01:22    


 


Urine Mucus  Rare   01/15/18  01:22    








ECG


normal sinus rhythm


vent rate 91, 


poor baseline in several leads, difficult to interpret


T wave flattening lead2, V5, TWI lead 1, aVL





Radiology Reports


Ct head


THIS IS A PRELIMINARY REPORT FROM IMAGING ON CALL


IMPRESSION: 2.9 cm suprasellar mass may indicate neoplasm or aneurysm and 

should be


followed up with enhanced MRI/MRA or enhanced CT/CTA.


Diffuse sinusitis.


THIS DOCUMENT HAS BEEN ELECTRONICALLY SIGNED


Abdon Nguyen MD


01/15/2018 00:21 EST





Chest xray, single view


THIS IS A PRELIMINARY REPORT FROM IMAGING ON CALL


IMPRESSION: Possible left lower lobe pneumonia with small effusion.


THIS DOCUMENT HAS BEEN ELECTRONICALLY SIGNED


Abdon Nguyen MD


01/15/2018 01:01 EST








ASSESSMENT/PLAN:


67yF with PMH HTN, DM, Seizure disorder, hypothyroid, pituitary tumor s/p 

resection presented to the ED via EMS with AMS and seizure activity. She was 

found to be febrile upon arrival as well.





Influenza A with pneumonia


   - unclear when symptoms started, will start tamiflu. (renal dose 30mg BID, 

CrCl 38 based on ideal body weight) DC tamiflu if sx started >48h ago


   - zosyn 4.5g ordered in ED, vanco 1g ordered


   - recommend ID consult, defer to PCP in am


   - NS 1L bolus ordered in ED, when complete, will give 75cc/hr





Elevated troponin


   - likely demand ischemia


   - repeat ECG in am


   - trend troponin


   - recommend cardiology consult, defer to PCP in am





seizure


   - pt unsure of keppra dose at home. Will start at 250 BID, Primary care team 

to confirm in am





DANITA


   - slight bump in creatinine from 1.0 to 1.2, will cont to monitor, if 

continues to decline, renal consult


   - IVF 75cc/hr





Pituitary tumor


   - unclear what resection (total vs partial) done in October, CT findings may 

be old. 


   - Primary care team to f/u in am. pt cannot remember name of neurologist, 

but believes surgery was done at [facility].





HTN


   - cont home medications





DM


   - unclear what dose of insulin pt taking, pt unsure


   - will hold metformin


   - BGM AC/HS with novolog sliding scale.





DVT PPX


   - heparin 5000u TID





FEN


   - NS @ 75cc/hr


   - BMP in am


   - diabetic/low sodium diet





Dispo: pt currently requires ICU level of care for intensive monitoring at this 

time.








Visit type





- Emergency Visit


Emergency Visit: Yes


ED Registration Date: 01/14/18


Care time: The patient presented to the Emergency Department on the above date 

and was hospitalized for further evaluation of their emergent condition.





- New Patient


This patient is new to me today: Yes


Date on this admission: 01/15/18





- Critical Care


Critical Care patient: Yes


Total Critical Care Time (in minutes): 45


Critical Care Statement: The care of this patient involved high complexity 

decision making to prevent further life threatening deterioration of the patient

's condition and/or to evaluate & treat vital organ system(s) failure or risk 

of failure.

## 2018-01-16 LAB
ANION GAP SERPL CALC-SCNC: 7 MMOL/L (ref 8–16)
BASOPHILS # BLD: 2.6 % (ref 0–2)
BUN SERPL-MCNC: 4 MG/DL (ref 7–18)
CALCIUM SERPL-MCNC: 8.3 MG/DL (ref 8.5–10.1)
CHLORIDE SERPL-SCNC: 109 MMOL/L (ref 98–107)
CO2 SERPL-SCNC: 27 MMOL/L (ref 21–32)
CREAT SERPL-MCNC: 0.8 MG/DL (ref 0.55–1.02)
DEPRECATED RDW RBC AUTO: 15.2 % (ref 11.6–15.6)
EOSINOPHIL # BLD: 5.4 % (ref 0–4.5)
GLUCOSE SERPL-MCNC: 100 MG/DL (ref 74–106)
HCT VFR BLD CALC: 37.3 % (ref 32.4–45.2)
HGB BLD-MCNC: 12.4 GM/DL (ref 10.7–15.3)
LYMPHOCYTES # BLD: 42.6 % (ref 8–40)
MAGNESIUM SERPL-MCNC: 1.9 MG/DL (ref 1.8–2.4)
MCH RBC QN AUTO: 29.8 PG (ref 25.7–33.7)
MCHC RBC AUTO-ENTMCNC: 33.3 G/DL (ref 32–36)
MCV RBC: 89.6 FL (ref 80–96)
MONOCYTES # BLD AUTO: 19.3 % (ref 3.8–10.2)
NEUTROPHILS # BLD: 30.1 % (ref 42.8–82.8)
PLATELET # BLD AUTO: 194 K/MM3 (ref 134–434)
PMV BLD: 9.8 FL (ref 7.5–11.1)
POTASSIUM SERPLBLD-SCNC: 3.9 MMOL/L (ref 3.5–5.1)
RBC # BLD AUTO: 4.17 M/MM3 (ref 3.6–5.2)
SODIUM SERPL-SCNC: 143 MMOL/L (ref 136–145)
WBC # BLD AUTO: 3.9 K/MM3 (ref 4–10)

## 2018-01-16 RX ADMIN — METOPROLOL TARTRATE SCH MG: 50 TABLET, FILM COATED ORAL at 21:09

## 2018-01-16 RX ADMIN — LEVETIRACETAM SCH MG: 250 TABLET, FILM COATED ORAL at 21:09

## 2018-01-16 RX ADMIN — LEVETIRACETAM SCH MG: 250 TABLET, FILM COATED ORAL at 09:52

## 2018-01-16 RX ADMIN — HEPARIN SODIUM SCH UNIT: 5000 INJECTION, SOLUTION INTRAVENOUS; SUBCUTANEOUS at 06:13

## 2018-01-16 RX ADMIN — INSULIN ASPART SCH: 100 INJECTION, SOLUTION INTRAVENOUS; SUBCUTANEOUS at 17:33

## 2018-01-16 RX ADMIN — HEPARIN SODIUM SCH UNIT: 5000 INJECTION, SOLUTION INTRAVENOUS; SUBCUTANEOUS at 14:39

## 2018-01-16 RX ADMIN — OSELTAMIVIR PHOSPHATE SCH MG: 75 CAPSULE ORAL at 10:55

## 2018-01-16 RX ADMIN — METOPROLOL TARTRATE SCH MG: 50 TABLET, FILM COATED ORAL at 09:53

## 2018-01-16 RX ADMIN — SODIUM CHLORIDE SCH MLS/HR: 9 INJECTION, SOLUTION INTRAVENOUS at 06:28

## 2018-01-16 RX ADMIN — PANTOPRAZOLE SODIUM SCH MG: 40 TABLET, DELAYED RELEASE ORAL at 09:53

## 2018-01-16 RX ADMIN — INSULIN ASPART SCH: 100 INJECTION, SOLUTION INTRAVENOUS; SUBCUTANEOUS at 12:11

## 2018-01-16 RX ADMIN — SODIUM CHLORIDE SCH MLS/HR: 9 INJECTION, SOLUTION INTRAVENOUS at 09:56

## 2018-01-16 RX ADMIN — LEVOTHYROXINE SODIUM SCH MCG: 75 TABLET ORAL at 06:18

## 2018-01-16 RX ADMIN — OSELTAMIVIR PHOSPHATE SCH MG: 75 CAPSULE ORAL at 21:10

## 2018-01-16 RX ADMIN — INSULIN ASPART SCH: 100 INJECTION, SOLUTION INTRAVENOUS; SUBCUTANEOUS at 21:15

## 2018-01-16 RX ADMIN — HEPARIN SODIUM SCH UNIT: 5000 INJECTION, SOLUTION INTRAVENOUS; SUBCUTANEOUS at 21:10

## 2018-01-16 RX ADMIN — INSULIN ASPART SCH: 100 INJECTION, SOLUTION INTRAVENOUS; SUBCUTANEOUS at 06:13

## 2018-01-16 RX ADMIN — VITAMIN D, TAB 1000IU (100/BT) SCH UNIT: 25 TAB at 09:53

## 2018-01-16 RX ADMIN — AMLODIPINE BESYLATE SCH MG: 5 TABLET ORAL at 09:53

## 2018-01-16 NOTE — PN
Progress Note (short form)





- Note


Progress Note: 





 Neurology








History


67 year old female with a significant past medical history of pituitary tumor s/

p resection 10/2017 and DM who presented to the ED via EMS for altered mental 

status. EMS also reported seizure activity on the way to the hospital for which 

the patient received versed 5mg. The patient was on Keppra 250mg twice daily 

which is typically a subtheraputic dose, increased to 750mg twice daily. She 

was admitted to ICU for close monitoring. She compelted CT head which showed 

suprasellar mass. Reportedly, she completed surgical intervention at 

[facility] in October 2017. Dr. Oneil covering over weekend, 

ordered MRI pituatary. Patient seen in ICU and currently awake and talking but 

very slow to respond to questions. Unclear what her baseline is. She was found 

to have positive Influenza A. 








Active Medications





Acetaminophen (Tylenol -)  650 mg PO Q6H PRN


   PRN Reason: FEVER


Amlodipine Besylate (Norvasc -)  5 mg PO DAILY [facility]


Chlorhexidine Gluconate (Hibiclens For Decolonization -)  1 applic TP HS [facility]


Cholecalciferol (Vitamin D3 -)  2,000 unit PO DAILY [facility]


Heparin Sodium (Porcine) (Heparin -)  5,000 unit SQ TID [facility]


Sodium Chloride (Normal Saline -)  1,000 mls @ 75 mls/hr IV ASDIR [facility]


Insulin Aspart (Novolog Vial Sliding Scale -)  1 vial SQ TIDAC [facility]


   PRN Reason: Protocol


Insulin Aspart (Novolog Vial Sliding Scale -)  1 vial SQ HS [facility]


   PRN Reason: Protocol


Levetiracetam (Keppra -)  750 mg PO BID [facility]


   Last Admin: 01/15/18 22:09 Dose:  750 mg


Levothyroxine Sodium (Synthroid -)  75 mcg PO DAILY@0700 [facility]


Magnesium Sulfate (Magnesium Sulfate)  1 gm IVPB ONCE ONE


   Stop: 01/16/18 07:26


Magnesium Sulfate (Magnesium Sulfate)  1 gm IVPB ONCE ONE


   Stop: 01/16/18 07:26


Metoprolol Tartrate (Lopressor -)  50 mg PO BID [facility]


Mupirocin (Bactroban Ointment (For Decolonization) -)  1 applic NS BID [facility]


   Stop: 01/20/18 09:59


Oseltamivir Phosphate (Tamiflu -)  75 mg PO BID [facility]


   Stop: 01/20/18 21:59


   Last Admin: 01/15/18 22:09 Dose:  75 mg


Pantoprazole Sodium (Protonix -)  40 mg PO DAILY [facility]














Physical Exam-Neuro


 Vital Signs











Temperature  100.3 F H  01/15/18 23:00


 


Pulse Rate  76   01/16/18 08:52


 


Respiratory Rate  22   01/16/18 08:52


 


Blood Pressure  112/74   01/16/18 08:52


 


O2 Sat by Pulse Oximetry (%)  95   01/15/18 21:00











Examination


Alert oriented x 3, speech is normal able to follow command


EOMI , vf is normal by confrontation and pupils is reactive


No facial asymmetry, 


Moving all extremity, strenght grossly intact


Sensory normal


No abnormal movements


 CBCD











WBC  3.9 K/mm3 (4.0-10.0)  L  01/16/18  05:00    


 


RBC  4.17 M/mm3 (3.60-5.2)   01/16/18  05:00    


 


Hgb  12.4 GM/dL (10.7-15.3)   01/16/18  05:00    


 


Hct  37.3 % (32.4-45.2)   01/16/18  05:00    


 


MCV  89.6 fl (80-96)   01/16/18  05:00    


 


MCHC  33.3 g/dl (32.0-36.0)   01/16/18  05:00    


 


RDW  15.2 % (11.6-15.6)   01/16/18  05:00    


 


Plt Count  194 K/MM3 (134-434)   01/16/18  05:00    


 


MPV  9.8 fl (7.5-11.1)   01/16/18  05:00    








 CMP











Sodium  143 mmol/L (136-145)   01/16/18  05:00    


 


Potassium  3.9 mmol/L (3.5-5.1)   01/16/18  05:00    


 


Chloride  109 mmol/L ()  H  01/16/18  05:00    


 


Carbon Dioxide  27 mmol/L (21-32)   01/16/18  05:00    


 


Anion Gap  7  (8-16)  L  01/16/18  05:00    


 


BUN  4 mg/dL (7-18)  L  01/16/18  05:00    


 


Creatinine  0.8 mg/dL (0.55-1.02)   01/16/18  05:00    


 


Creat Clearance w eGFR  45.09  (>60)   01/14/18  23:55    


 


Calcium  8.3 mg/dL (8.5-10.1)  L  01/16/18  05:00    


 


Total Bilirubin  0.9 mg/dL (0.2-1.0)  D 01/14/18  23:55    


 


AST  62 U/L (15-37)  H D 01/14/18  23:55    


 


ALT  59 U/L (12-78)  D 01/14/18  23:55    


 


Alkaline Phosphatase  69 U/L ()   01/14/18  23:55    


 


Total Protein  7.2 g/dl (6.4-8.2)   01/14/18  23:55    


 


Albumin  3.7 g/dl (3.4-5.0)   01/14/18  23:55    








CT head reviewed





Plan


67 year old female with a significant past medical history of pituitary tumor s/

p resection 10/2017 and DM who presented to the ED via EMS for altered mental 

status. EMS also reported seizure activity on the way to the hospital for which 

the patient received versed 5mg. The patient was on Keppra 250mg twice daily 

which is typically a subtheraputic dose, increased to 750mg twice daily. She 

was admitted to ICU for close monitoring. She compelted CT head which showed 

suprasellar mass. 


Follow up MRI pituatary


Collaterals to determine if surgical intervention occured


Depending on MRI, NSGY consult may be indicated


Continue Keppra 750mg twice daily


Positive Influenza A, ID following, continue Tamiflu (5 day course recommended)


Seizure monitoring


EEG ordered by Dr. Oneil


Critical care time 35 mins

## 2018-01-16 NOTE — PN
Physical Exam: 


SUBJECTIVE: Patient seen and examined


The patient is a 67 year old female with a history of HTN, DM, HLD, pituitary 

tumor s/p resection in 10/17 who presented for lethargy and AMS treated with 

5mg of midazolam for seizure like activity en rout to the ED found to be 

influenza A positive with a concern for a pneumonia on chest plain film.  ID 

was consulted and recommended stopping ceftriaxone and continuing the Tamiflu.  

Neurology was consulted and recommended increasing the patient's keppra to 

750mg BID for appropriate seizure management.  Patient currently has no 

complaints this morning.  No acute events overnight.








OBJECTIVE:





 Vital Signs











 Period  Temp  Pulse  Resp  BP Sys/[place]  Pulse Ox


 


 Last 24 Hr  98.9 F-100.3 F  66-84  18-22  /59-90  95











GENERAL: The patient is awake, alert, and oriented x2, in no acute distress.


HEAD: Normal with no signs of trauma.


EYES: sclera anicteric, conjunctiva clear. No ptosis. 


ENT: nares patent, oropharynx clear without exudates, moist mucous 


membranes.


NECK: Trachea midline, full range of motion, supple. 


LUNGS: Breath sounds equal, clear to auscultation bilaterally, no wheezes, no 

crackles, no 


accessory muscle use. 


HEART: Regular rate and rhythm, S1, S2 without murmur, rub or gallop.


ABDOMEN: Soft, nontender, nondistended, normoactive bowel sounds, no guarding, 

no 


rebound, no hepatosplenomegaly, no masses.


EXTREMITIES: 2+ pulses, warm, well-perfused, no edema. 


NEUROLOGICAL:  Normal speech, gait not 


observed.


PSYCH: Normal mood, normal affect.


SKIN: Warm, dry, normal turgor, no rashes or lesions noted














 Laboratory Results - last 24 hr











  01/15/18 01/15/18 01/15/18





  06:23 12:10 12:51


 


WBC   


 


RBC   


 


Hgb   


 


Hct   


 


MCV   


 


MCH   


 


MCHC   


 


RDW   


 


Plt Count   


 


MPV   


 


Neutrophils %   


 


Lymphocytes %   


 


Monocytes %   


 


Eosinophils %   


 


Basophils %   


 


Sodium   


 


Potassium   


 


Chloride   


 


Carbon Dioxide   


 


Anion Gap   


 


BUN   


 


Creatinine   


 


POC Glucometer  121.23150   145.48275


 


Random Glucose   


 


Calcium   


 


Creatine Kinase   248 H 


 


Creatine Kinase Index   0.4 


 


CK-MB (CK-2)   < 1.000 


 


Troponin I   0.13 H 


 


Triglycerides   135 


 


Cholesterol   181 


 


Total LDL Cholesterol   128 H 


 


HDL Cholesterol   43 


 


TSH   














  01/15/18 01/16/18 01/16/18





  17:35 05:00 05:00


 


WBC   3.9 L 


 


RBC   4.17 


 


Hgb   12.4 


 


Hct   37.3 


 


MCV   89.6 


 


MCH   29.8 


 


MCHC   33.3 


 


RDW   15.2 


 


Plt Count   194 


 


MPV   9.8 


 


Neutrophils %   30.1 L D 


 


Lymphocytes %   42.6 H D 


 


Monocytes %   19.3 H 


 


Eosinophils %   5.4 H D 


 


Basophils %   2.6 H 


 


Sodium    143


 


Potassium    3.9


 


Chloride    109 H


 


Carbon Dioxide    27


 


Anion Gap    7 L


 


BUN    4 L


 


Creatinine    0.8


 


POC Glucometer  [phone]  


 


Random Glucose    100


 


Calcium    8.3 L


 


Creatine Kinase   


 


Creatine Kinase Index   


 


CK-MB (CK-2)   


 


Troponin I   


 


Triglycerides   


 


Cholesterol   


 


Total LDL Cholesterol   


 


HDL Cholesterol   


 


TSH    0.02 L








Active Medications











Generic Name Dose Route Start Last Admin





  Trade Name Mariama  PRN Reason Stop Dose Admin


 


Acetaminophen  650 mg  01/16/18 07:25  





  Tylenol -  PO   





  Q6H PRN   





  FEVER   


 


Amlodipine Besylate  5 mg  01/16/18 10:00  





  Norvasc -  PO   





  DAILY [facility]   


 


Chlorhexidine Gluconate  1 applic  01/16/18 22:00  





  Hibiclens For Decolonization -  TP   





  HS [facility]   


 


Cholecalciferol  2,000 unit  01/16/18 10:00  





  Vitamin D3 -  PO   





  DAILY [facility]   


 


Heparin Sodium (Porcine)  5,000 unit  01/16/18 14:00  





  Heparin -  SQ   





  TID [facility]   


 


Sodium Chloride  1,000 mls @ 75 mls/hr  01/16/18 07:25  





  Normal Saline -  IV   





  ASDIR [facility]   


 


Insulin Aspart  1 vial  01/16/18 11:00  





  Novolog Vial Sliding Scale -  SQ   





  TIDAC [facility]   





  Protocol   


 


Insulin Aspart  1 vial  01/16/18 22:00  





  Novolog Vial Sliding Scale -  SQ   





  HS [facility]   





  Protocol   


 


Levetiracetam  750 mg  01/15/18 22:00  01/15/18 22:09





  Keppra -  PO   750 mg





  BID [facility]   Administration


 


Levothyroxine Sodium  75 mcg  01/17/18 07:00  





  Synthroid -  PO   





  DAILY@0700 [facility]   


 


Magnesium Sulfate  1 gm  01/16/18 07:25  





  Magnesium Sulfate  IVPB  01/16/18 07:26  





  ONCE ONE   


 


Magnesium Sulfate  1 gm  01/16/18 07:25  





  Magnesium Sulfate  IVPB  01/16/18 07:26  





  ONCE ONE   


 


Metoprolol Tartrate  50 mg  01/16/18 10:00  





  Lopressor -  PO   





  BID [facility]   


 


Mupirocin  1 applic  01/16/18 10:00  





  Bactroban Ointment (For Decolonization) -  NS  01/20/18 09:59  





  BID [facility]   


 


Oseltamivir Phosphate  75 mg  01/15/18 22:00  01/15/18 22:09





  Tamiflu -  PO  01/20/18 21:59  75 mg





  BID [facility]   Administration


 


Pantoprazole Sodium  40 mg  01/16/18 10:00  





  Protonix -  PO   





  DAILY [facility]   











ASSESSMENT/PLAN:


The patient is a 67 year old female with a history of HTN, DM, HLD, pituitary 

tumor s/p resection in 10/17 who presented for lethargy and AMS treated with 

5mg of midazolam for seizure like activity en rout to the ED found to be 

influenza A positive with a concern for a pneumonia on chest plain film.








NEURO


#Seizure


-Continue keppra 750mg BID


-No seizure activity since presentation


-Patient to have MRI today for further evaluation of pituitary tumor.





CV


#HTN


-Continue home medications


-Continue to monitor vitals





RESP


Patient denies any symptoms currently.


-Continue treatment with Tamiflu for influenza.


-We will stop antibiotic treatment per ID recommendations.





GI


No issues currently.





Heme


No Issues currently.





Renal


-Good GFR and stable BUN/creatinine


-Will d/c coe


-Will continue to monitor.





MSK


No issues currently





FEN/GI


-Replete electrolytes PRN, will monitor








PPX


-Continue protonix 40mg daily.


-Continue SCDs





DISPO: Stable for med/surg transfer.





Visit type





- Emergency Visit


Emergency Visit: No





- New Patient


This patient is new to me today: No





- Critical Care


Critical Care patient: No

## 2018-01-16 NOTE — PN
Progress Note, Physician


Chief Complaint: 


Ms Farnsworth is without complaint this am. Denies cp, sob, n/v, or any other 

concerns.





- Current Medication List


Current Medications: 


Active Medications





Acetaminophen (Tylenol -)  650 mg PO Q6H PRN


   PRN Reason: FEVER


Amlodipine Besylate (Norvasc -)  5 mg PO DAILY [facility]


   Last Admin: 01/16/18 09:53 Dose:  5 mg


Chlorhexidine Gluconate (Hibiclens For Decolonization -)  1 applic TP HS [facility]


Cholecalciferol (Vitamin D3 -)  2,000 unit PO DAILY [facility]


   Last Admin: 01/16/18 09:53 Dose:  2,000 unit


Heparin Sodium (Porcine) (Heparin -)  5,000 unit SQ TID [facility]


Sodium Chloride (Normal Saline -)  1,000 mls @ 75 mls/hr IV ASDIR [facility]


   Last Admin: 01/16/18 09:56 Dose:  75 mls/hr


Insulin Aspart (Novolog Vial Sliding Scale -)  1 vial SQ TIDAC [facility]


   PRN Reason: Protocol


Insulin Aspart (Novolog Vial Sliding Scale -)  1 vial SQ [facility]


   PRN Reason: Protocol


Levetiracetam (Keppra -)  750 mg PO BID [facility]


   Last Admin: 01/16/18 09:52 Dose:  750 mg


Levothyroxine Sodium (Synthroid -)  75 mcg PO DAILY@0700 [facility]


Magnesium Sulfate (Magnesium Sulfate)  1 gm IVPB ONCE ONE


   Stop: 01/16/18 07:26


Magnesium Sulfate (Magnesium Sulfate)  1 gm IVPB ONCE ONE


   Stop: 01/16/18 07:26


Metoprolol Tartrate (Lopressor -)  50 mg PO BID [facility]


   Last Admin: 01/16/18 09:53 Dose:  50 mg


Mupirocin (Bactroban Ointment (For Decolonization) -)  1 applic NS BID [facility]


   Stop: 01/20/18 09:59


   Last Admin: 01/16/18 09:52 Dose:  1 applic


Oseltamivir Phosphate (Tamiflu -)  75 mg PO BID [facility]


   Stop: 01/20/18 21:59


   Last Admin: 01/15/18 22:09 Dose:  75 mg


Pantoprazole Sodium (Protonix -)  40 mg PO DAILY [facility]


   Last Admin: 01/16/18 09:53 Dose:  40 mg











- Objective


Vital Signs: 


 Vital Signs











Temperature  37.3 C   01/16/18 10:00


 


Pulse Rate  76   01/16/18 08:00


 


Respiratory Rate  22   01/16/18 10:00


 


Blood Pressure  146/73   01/16/18 10:00


 


O2 Sat by Pulse Oximetry (%)  95   01/15/18 21:00











Constitutional: Yes: Well Nourished, No Distress, Calm


Cardiovascular: Yes: Regular Rate and Rhythm.  No: Gallop, Murmur, Rub


Respiratory: Yes: Regular, CTA Bilaterally.  No: Rales, Rhonchi, Wheezes


Gastrointestinal: Yes: Normal Bowel Sounds, Soft.  No: Distention, Tenderness


Extremities: Yes: WNL


Edema: No


Labs: 


 CBC, BMP





 01/16/18 05:00 





 01/16/18 05:00 





 INR, PTT











INR  1.16  (0.82-1.09)  H  01/14/18  23:55    














Problem List





- Problems


(1) Seizure


Assessment/Plan: 


-unclear source


-appreciate neurology assistance, note reviewed


-continue increased dose of keppra


-reviewed neurosurgery note


-may be beneficial to stabilize acute problem and have her follow up with her 

previous physicians


Code(s): R56.9 - UNSPECIFIED CONVULSIONS   





(2) Pituitary adenoma


Assessment/Plan: 


-currently unable to obtain MRI


-may benefit from outpatient follow up with previous surgeon and physicians


Code(s): D35.2 - BENIGN NEOPLASM OF PITUITARY GLAND   





(3) T2DM (type 2 diabetes mellitus)


Assessment/Plan: 


-diabetic diet


-continue insulin


Code(s): E11.9 - TYPE 2 DIABETES MELLITUS WITHOUT COMPLICATIONS   





(4) Influenza A


Assessment/Plan: 


-continue tamiflu


Code(s): J10.1 - FLU DUE TO OTH IDENT INFLUENZA VIRUS W OTH RESP MANIFEST

## 2018-01-16 NOTE — PN
Teaching Attending Note


Name of Resident: Ky Jack





ATTENDING PHYSICIAN STATEMENT





I saw and evaluated the patient.


I reviewed the resident's note and discussed the case with the resident.


I agree with the resident's findings and plan as documented.








SUBJECTIVE:





Pt seen and examined in the ICU. No further seizures. More alert, awake today. 

Fever curve trending down.





OBJECTIVE:





 Last Vital Signs











Temp Pulse Resp BP Pulse Ox


 


 99.2 F   76   22   146/73   95 


 


 01/16/18 10:00  01/16/18 08:00  01/16/18 10:00  01/16/18 10:00  01/16/18 09:00








 Intake & Output











 01/13/18 01/14/18 01/15/18 01/16/18





 23:59 23:59 23:59 23:59


 


Intake Total   2290 900


 


Output Total   1625 


 


Balance   665 900


 


Weight  106.594 kg 78.642 kg 








Gen:  NAD at rest, more awake, alert


Heart: RRR


Lung: decreased breath sounds at the bases


Abd: soft, nontender


Ext: no edema





 CBC, BMP





 01/16/18 05:00 





 01/16/18 05:00 





Active Medications





Acetaminophen (Tylenol -)  650 mg PO Q6H PRN


   PRN Reason: FEVER


Amlodipine Besylate (Norvasc -)  5 mg PO DAILY [facility]


   Last Admin: 01/16/18 09:53 Dose:  5 mg


Chlorhexidine Gluconate (Hibiclens For Decolonization -)  1 applic TP HS [facility]


Cholecalciferol (Vitamin D3 -)  2,000 unit PO DAILY [facility]


   Last Admin: 01/16/18 09:53 Dose:  2,000 unit


Heparin Sodium (Porcine) (Heparin -)  5,000 unit SQ TID [facility]


Sodium Chloride (Normal Saline -)  1,000 mls @ 75 mls/hr IV ASDIR [facility]


   Last Admin: 01/16/18 09:56 Dose:  75 mls/hr


Insulin Aspart (Novolog Vial Sliding Scale -)  1 vial SQ TIDAC [facility]


   PRN Reason: Protocol


Insulin Aspart (Novolog Vial Sliding Scale -)  1 vial SQ HS [facility]


   PRN Reason: Protocol


Levetiracetam (Keppra -)  750 mg PO BID [facility]


   Last Admin: 01/16/18 09:52 Dose:  750 mg


Levothyroxine Sodium (Synthroid -)  75 mcg PO DAILY@0700 [facility]


Metoprolol Tartrate (Lopressor -)  50 mg PO BID [facility]


   Last Admin: 01/16/18 09:53 Dose:  50 mg


Mupirocin (Bactroban Ointment (For Decolonization) -)  1 applic NS BID [facility]


   Stop: 01/20/18 09:59


   Last Admin: 01/16/18 09:52 Dose:  1 applic


Oseltamivir Phosphate (Tamiflu -)  75 mg PO BID [facility]


   Stop: 01/20/18 21:59


   Last Admin: 01/16/18 10:55 Dose:  75 mg


Pantoprazole Sodium (Protonix -)  40 mg PO DAILY [facility]


   Last Admin: 01/16/18 09:53 Dose:  40 mg








ASSESSMENT AND PLAN:


Seizure Episode


Pituitary Tumor


Influenza A


HTN


DM


Hypothyroidism





-  continue antiepileptics


-  seizure precautions


-  MRI brain


-  tamiflu


-  respiratory isolation


-  await records from prior pituitary surgery


-  PO as tolerated


-  DVT prophylaxis


-  can monitor on floor

## 2018-01-16 NOTE — CONSULT
Consult - text type





- Consultation


Consultation Note: 





NEUROSURGERY CONSULTATION





Patient is a 67 year old  female who was admitted with altered 

mental status. Head CT demonstrates a 3cm pituitary/sellar mass which is 

consistent with Pituitary Adenoma, however, CT report describes large aneurysm 

in the differential diagnosis. MRI Brain from 2013 demonstrates a large 

pituitary adenoma with tumor surrounding and lateral to the Left carotid artery 

in the cavernous sinus. Tumor is approximately the same size. The patient 

reportedly underwent resection in October 2017 at [facility]. No notes or records are 

available for review and patient gives a very limited history. Patient unable 

to answer questionaire regarding metallic implants which is hindering our 

ability to obtain new MRI.





With limited records, it is difficult to determine whether her tumor grew since 

2013 and resection returned it to the same size or whether the tumor remained 

the same and only limited biopsy was performed. Potential intraoperative 

difficulties unknown which makes further surgery in the absence of detailed 

records a risky endeavor.





Acute problems such as seizure activity can be managed by [facility] with Neurology 

guidance.





CT does not suggest hemorrhage/apoplexy and it does not appear that there is 

any chiasmatic compression (MRI would be far more helpful, or at least a 

patient who can comply with visual field testing). Ophthalmology may be able to 

assess acute visual field deficits and the potential need for decompression of 

the chiasm.





Endocrinological evaluation for pituitary axis dysfunction may be warranted.





I understand that initial efforts to seek outside records/family input have not 

been successful. There is little role for acute Neurosurgical intervention 

without imaging, history and a sound understanding of what took place in 

October.

## 2018-01-16 NOTE — PN
Progress Note, Physician


Chief Complaint: 





ID











ICU follow up for this 67 year old female presents with altered mental status, 

seizures history of pituitary tumor and positive rapid test Influenza A.  

Currently alert in no distress and reports no complaints.  Her HIV status 

unknown and when asked says I don"t need that. Denies OSB coph chills now No 

headaches





Gets Tamiflu and Ceftriaxone





- Current Medication List


Current Medications: 


Active Medications





Acetaminophen (Tylenol -)  650 mg PO Q6H PRN


   PRN Reason: FEVER


   Last Admin: 01/16/18 00:15 Dose:  650 mg


Amlodipine Besylate (Norvasc -)  5 mg PO DAILY [facility]


   Last Admin: 01/15/18 09:33 Dose:  5 mg


Chlorhexidine Gluconate (Hibiclens For Decolonization -)  1 applic TP HS [facility]


   Last Admin: 01/15/18 22:08 Dose:  1 applic


Cholecalciferol (Vitamin D3 -)  2,000 unit PO DAILY [facility]


   Last Admin: 01/15/18 09:33 Dose:  2,000 unit


Heparin Sodium (Porcine) (Heparin -)  5,000 unit SQ TID [facility]


   Last Admin: 01/16/18 06:13 Dose:  5,000 unit


Sodium Chloride (Normal Saline -)  1,000 mls @ 75 mls/hr IV ASDIR [facility]


   Last Admin: 01/16/18 06:28 Dose:  75 mls/hr


CEFTRIAXONE 1 G/50 ML PREMIX (Ceftriaxone 1 Gm-D5w Bag)  50 mls @ 100 mls/hr 

IVPB DAILY [facility]


   Last Admin: 01/15/18 09:33 Dose:  100 mls/hr


Insulin Aspart (Novolog Vial Sliding Scale -)  1 vial SQ TIDAC [facility]


   PRN Reason: Protocol


   Last Admin: 01/16/18 06:13 Dose:  Not Given


Insulin Aspart (Novolog Vial Sliding Scale -)  1 vial SQ HS [facility]


   PRN Reason: Protocol


   Last Admin: 01/15/18 22:08 Dose:  Not Given


Levetiracetam (Keppra -)  750 mg PO BID [facility]


   Last Admin: 01/15/18 22:09 Dose:  750 mg


Levothyroxine Sodium (Synthroid -)  75 mcg PO DAILY@0700 [facility]


   Last Admin: 01/16/18 06:18 Dose:  75 mcg


Metoprolol Tartrate (Lopressor -)  50 mg PO BID [facility]


   Last Admin: 01/15/18 22:08 Dose:  50 mg


Mupirocin (Bactroban Ointment (For Decolonization) -)  1 applic NS BID [facility]


   Stop: 01/20/18 09:59


   Last Admin: 01/15/18 22:07 Dose:  1 applic


Oseltamivir Phosphate (Tamiflu -)  75 mg PO BID [facility]


   Stop: 01/20/18 21:59


   Last Admin: 01/15/18 22:09 Dose:  75 mg


Pantoprazole Sodium (Protonix -)  40 mg PO DAILY [facility]


   Last Admin: 01/15/18 09:33 Dose:  40 mg











- Objective


Vital Signs: 


 Vital Signs











Temperature  100.3 F H  01/15/18 23:00


 


Pulse Rate  74   01/16/18 05:00


 


Respiratory Rate  21   01/16/18 05:00


 


Blood Pressure  110/90   01/16/18 05:00


 


O2 Sat by Pulse Oximetry (%)  95   01/15/18 21:00











Constitutional: Yes: Well Nourished, No Distress


Eyes: Yes: WNL, Conjunctiva Clear


HENT: Yes: WNL, Atraumatic


Neck: Yes: WNL, Supple, Trachea Midline


Cardiovascular: Yes: Regular Rate and Rhythm, S1, S2.  No: Murmur


Respiratory: Yes: WNL, Regular, CTA Bilaterally.  No: Rales, Rhonchi


Gastrointestinal: Yes: WNL, Normal Bowel Sounds, Soft.  No: Splenomegaly, 

Tenderness, Tenderness, Rebound


Extremities: No: Cold, Cool, Cyanosis


Edema: No


Labs: 


 CBC, BMP





 01/16/18 05:00 





 INR, PTT











INR  1.16  (0.82-1.09)  H  01/14/18  23:55    














Problem List





- Problems


(1) Altered mental status


Code(s): R41.82 - ALTERED MENTAL STATUS, UNSPECIFIED   





(2) Fever


Code(s): R50.9 - FEVER, UNSPECIFIED   





(3) Influenza A


Code(s): J10.1 - FLU DUE TO OTH IDENT INFLUENZA VIRUS W OTH RESP MANIFEST   





(4) Pituitary adenoma


Code(s): D35.2 - BENIGN NEOPLASM OF PITUITARY GLAND   





(5) Seizure


Code(s): R56.9 - UNSPECIFIED CONVULSIONS   





Assessment/Plan





Microbiology





01/14/18 00:01   Nasopharyngeal Swab   Influenza Types A,B Antigen ([facility]) - Final


01/14/18 00:01   Nasopharyngeal Swab    - Final


01/15/18 00:38   Blood - Peripheral Venous   Blood Culture - Preliminary


                              NO GROWTH OBTAINED AFTER 24 HOURS, INCUBATION TO 

CONTINUE


                              FOR 4 DAYS.


01/15/18 00:30   Blood - Peripheral Venous   Blood Culture - Preliminary


                              NO GROWTH OBTAINED AFTER 24 HOURS, INCUBATION TO 

CONTINUE


                              FOR 4 DAYS.





 Laboratory Tests











  01/14/18 01/15/18 01/15/18





  23:55 01:22 05:20


 


WBC   


 


Hgb   


 


Plt Count   


 


Neutrophils %   


 


Lymphocytes %   


 


Monocytes %   


 


Eosinophils %   


 


Basophils %   


 


BUN    7


 


Creatinine    1.0


 


Calcium    8.4 L


 


ALT  59  D  


 


Alkaline Phosphatase  69  


 


Creatine Kinase  93  


 


Troponin I    0.13 H


 


Urine WBC (Auto)   1 


 


Urine RBC (Auto)   2 














  01/16/18





  05:00


 


WBC  3.9 L


 


Hgb  12.4


 


Plt Count  194


 


Neutrophils %  30.1 L D


 


Lymphocytes %  42.6 H D


 


Monocytes %  19.3 H


 


Eosinophils %  5.4 H D


 


Basophils %  2.6 H


 


BUN 


 


Creatinine 


 


Calcium 


 


ALT 


 


Alkaline Phosphatase 


 


Creatine Kinase 


 


Troponin I 


 


Urine WBC (Auto) 


 


Urine RBC (Auto) 








Assessment


Fevers 104 secondary influenza A


No evidence for airspace disease on chest ray


Seizures


Suprasellar mass


Encephalopathy secondary Influenza


Diabetes








Plan Seems to be much better today alert oriented


      Can stop Ceftriaxone


      Contnue Oseltamavir


      Respiratory isolation in place





ICU time spent 39 minutes





Rahat RODRIGUEZ

## 2018-01-17 LAB
ALBUMIN SERPL-MCNC: 3.3 G/DL (ref 3.4–5)
ALP SERPL-CCNC: 50 U/L (ref 45–117)
ALT SERPL-CCNC: 54 U/L (ref 12–78)
ANION GAP SERPL CALC-SCNC: 9 MMOL/L (ref 8–16)
AST SERPL-CCNC: 45 U/L (ref 15–37)
BASOPHILS # BLD: 1.6 % (ref 0–2)
BILIRUB SERPL-MCNC: 0.8 MG/DL (ref 0.2–1)
BUN SERPL-MCNC: 3 MG/DL (ref 7–18)
CALCIUM SERPL-MCNC: 8.2 MG/DL (ref 8.5–10.1)
CHLORIDE SERPL-SCNC: 106 MMOL/L (ref 98–107)
CO2 SERPL-SCNC: 26 MMOL/L (ref 21–32)
CREAT SERPL-MCNC: 0.7 MG/DL (ref 0.55–1.02)
DEPRECATED RDW RBC AUTO: 14.5 % (ref 11.6–15.6)
EOSINOPHIL # BLD: 10.5 % (ref 0–4.5)
GLUCOSE SERPL-MCNC: 90 MG/DL (ref 74–106)
HCT VFR BLD CALC: 39.2 % (ref 32.4–45.2)
HGB BLD-MCNC: 12.7 GM/DL (ref 10.7–15.3)
LYMPHOCYTES # BLD: 50.1 % (ref 8–40)
MAGNESIUM SERPL-MCNC: 1.7 MG/DL (ref 1.8–2.4)
MCH RBC QN AUTO: 28.8 PG (ref 25.7–33.7)
MCHC RBC AUTO-ENTMCNC: 32.3 G/DL (ref 32–36)
MCV RBC: 89.2 FL (ref 80–96)
MONOCYTES # BLD AUTO: 12.4 % (ref 3.8–10.2)
NEUTROPHILS # BLD: 25.4 % (ref 42.8–82.8)
PHOSPHATE SERPL-MCNC: 2.1 MG/DL (ref 2.5–4.9)
PLATELET # BLD AUTO: 209 K/MM3 (ref 134–434)
PMV BLD: 9.5 FL (ref 7.5–11.1)
POTASSIUM SERPLBLD-SCNC: 3.6 MMOL/L (ref 3.5–5.1)
PROT SERPL-MCNC: 6.4 G/DL (ref 6.4–8.2)
RBC # BLD AUTO: 4.4 M/MM3 (ref 3.6–5.2)
SODIUM SERPL-SCNC: 141 MMOL/L (ref 136–145)
WBC # BLD AUTO: 2.8 K/MM3 (ref 4–10)

## 2018-01-17 RX ADMIN — OSELTAMIVIR PHOSPHATE SCH MG: 75 CAPSULE ORAL at 22:42

## 2018-01-17 RX ADMIN — INSULIN ASPART SCH: 100 INJECTION, SOLUTION INTRAVENOUS; SUBCUTANEOUS at 17:24

## 2018-01-17 RX ADMIN — HEPARIN SODIUM SCH UNIT: 5000 INJECTION, SOLUTION INTRAVENOUS; SUBCUTANEOUS at 14:28

## 2018-01-17 RX ADMIN — LEVETIRACETAM SCH MG: 250 TABLET, FILM COATED ORAL at 09:57

## 2018-01-17 RX ADMIN — OSELTAMIVIR PHOSPHATE SCH MG: 75 CAPSULE ORAL at 11:46

## 2018-01-17 RX ADMIN — INSULIN ASPART SCH: 100 INJECTION, SOLUTION INTRAVENOUS; SUBCUTANEOUS at 06:07

## 2018-01-17 RX ADMIN — HEPARIN SODIUM SCH UNIT: 5000 INJECTION, SOLUTION INTRAVENOUS; SUBCUTANEOUS at 06:08

## 2018-01-17 RX ADMIN — AMLODIPINE BESYLATE SCH MG: 5 TABLET ORAL at 09:57

## 2018-01-17 RX ADMIN — METOPROLOL TARTRATE SCH MG: 50 TABLET, FILM COATED ORAL at 22:42

## 2018-01-17 RX ADMIN — PANTOPRAZOLE SODIUM SCH MG: 40 TABLET, DELAYED RELEASE ORAL at 09:57

## 2018-01-17 RX ADMIN — LEVOTHYROXINE SODIUM SCH MCG: 75 TABLET ORAL at 06:08

## 2018-01-17 RX ADMIN — LEVETIRACETAM SCH MG: 250 TABLET, FILM COATED ORAL at 22:42

## 2018-01-17 RX ADMIN — HEPARIN SODIUM SCH UNIT: 5000 INJECTION, SOLUTION INTRAVENOUS; SUBCUTANEOUS at 22:43

## 2018-01-17 RX ADMIN — VITAMIN D, TAB 1000IU (100/BT) SCH UNIT: 25 TAB at 09:57

## 2018-01-17 RX ADMIN — METOPROLOL TARTRATE SCH MG: 50 TABLET, FILM COATED ORAL at 09:57

## 2018-01-17 RX ADMIN — POTASSIUM & SODIUM PHOSPHATES POWDER PACK 280-160-250 MG SCH PACKET: 280-160-250 PACK at 22:43

## 2018-01-17 RX ADMIN — INSULIN ASPART SCH: 100 INJECTION, SOLUTION INTRAVENOUS; SUBCUTANEOUS at 11:50

## 2018-01-17 RX ADMIN — SODIUM CHLORIDE SCH MLS/HR: 9 INJECTION, SOLUTION INTRAVENOUS at 11:46

## 2018-01-17 RX ADMIN — INSULIN ASPART SCH: 100 INJECTION, SOLUTION INTRAVENOUS; SUBCUTANEOUS at 22:43

## 2018-01-17 NOTE — PN
Progress Note (short form)





- Note


Progress Note: 





 Neurology








History


67 year old female with a significant past medical history of pituitary tumor s/

p resection 10/2017 and DM who presented to the ED via EMS for altered mental 

status. EMS also reported seizure activity on the way to the hospital for which 

the patient received versed 5mg. The patient was on Keppra 250mg twice daily 

which is typically a subtheraputic dose, increased to 750mg twice daily. She 

was admitted to ICU for close monitoring. She compelted CT head which showed 

suprasellar mass. Reportedly, she completed surgical intervention at 

[facility] in October 2017. Dr. Oneil covering over weekend, 

ordered MRI pituatary. She was alos found to have positive Influenza A, being 

treated with Tamiflu. Hospitalist note reviewed and in agreement that patient 

may best served to follow up with prior neurosurgeon and facility. Imaging does 

not appear to  warrent acute intervention can be pursued outpatient. NSGY note 

reviewed and noted does not appear apoplectic or pressuring chiasm.  





Active Medications





Acetaminophen (Tylenol -)  650 mg PO Q6H PRN


   PRN Reason: FEVER


Amlodipine Besylate (Norvasc -)  5 mg PO DAILY [facility]


   Last Admin: 01/16/18 09:53 Dose:  5 mg


Cholecalciferol (Vitamin D3 -)  2,000 unit PO DAILY [facility]


   Last Admin: 01/16/18 09:53 Dose:  2,000 unit


Heparin Sodium (Porcine) (Heparin -)  5,000 unit SQ TID [facility]


   Last Admin: 01/17/18 06:08 Dose:  5,000 unit


Sodium Chloride (Normal Saline -)  1,000 mls @ 75 mls/hr IV [facility]


   Last Admin: 01/16/18 09:56 Dose:  75 mls/hr


Insulin Aspart (Novolog Vial Sliding Scale -)  1 vial SQ TIDAC [facility]


   PRN Reason: Protocol


   Last Admin: 01/17/18 06:07 Dose:  Not Given


Insulin Aspart (Novolog Vial Sliding Scale -)  1 vial SQ HS [facility]


   PRN Reason: Protocol


   Last Admin: 01/16/18 21:15 Dose:  Not Given


Levetiracetam (Keppra -)  750 mg PO BID [facility]


   Last Admin: 01/16/18 21:09 Dose:  750 mg


Levothyroxine Sodium (Synthroid -)  75 mcg PO DAILY@0700 [facility]


   Last Admin: 01/17/18 06:08 Dose:  75 mcg


Metoprolol Tartrate (Lopressor -)  50 mg PO BID [facility]


   Last Admin: 01/16/18 21:09 Dose:  50 mg


Oseltamivir Phosphate (Tamiflu -)  75 mg PO BID [facility]


   Stop: 01/20/18 21:59


   Last Admin: 01/16/18 21:10 Dose:  75 mg


Pantoprazole Sodium (Protonix -)  40 mg PO DAILY [facility]


   Last Admin: 01/16/18 09:53 Dose:  40 mg

















Physical Exam-Neuro


 


 Vital Signs











Temperature  98.2 F   01/17/18 05:45


 


Pulse Rate  73   01/17/18 05:45


 


Respiratory Rate  20   01/17/18 05:45


 


Blood Pressure  121/54   01/17/18 05:45


 


O2 Sat by Pulse Oximetry (%)  97   01/16/18 21:00














Examination


Alert oriented x 3, speech is normal able to follow command


EOMI , vf is normal by confrontation and pupils is reactive


No facial asymmetry, 


Moving all extremity, strenght grossly intact


Sensory normal


No abnormal movements


 


 CBCD











WBC  2.8 K/mm3 (4.0-10.0)  L  01/17/18  06:50    


 


RBC  4.40 M/mm3 (3.60-5.2)   01/17/18  06:50    


 


Hgb  12.7 GM/dL (10.7-15.3)   01/17/18  06:50    


 


Hct  39.2 % (32.4-45.2)   01/17/18  06:50    


 


MCV  89.2 fl (80-96)   01/17/18  06:50    


 


MCHC  32.3 g/dl (32.0-36.0)   01/17/18  06:50    


 


RDW  14.5 % (11.6-15.6)   01/17/18  06:50    


 


Plt Count  209 K/MM3 (134-434)   01/17/18  06:50    


 


MPV  9.5 fl (7.5-11.1)   01/17/18  06:50    








 CMP











Sodium  141 mmol/L (136-145)   01/17/18  06:50    


 


Potassium  3.6 mmol/L (3.5-5.1)   01/17/18  06:50    


 


Chloride  106 mmol/L ()   01/17/18  06:50    


 


Carbon Dioxide  26 mmol/L (21-32)   01/17/18  06:50    


 


Anion Gap  9  (8-16)   01/17/18  06:50    


 


BUN  3 mg/dL (7-18)  L  01/17/18  06:50    


 


Creatinine  0.7 mg/dL (0.55-1.02)   01/17/18  06:50    


 


Creat Clearance w eGFR  > 60  (>60)   01/17/18  06:50    


 


Calcium  8.2 mg/dL (8.5-10.1)  L  01/17/18  06:50    


 


Total Bilirubin  0.8 mg/dL (0.2-1.0)   01/17/18  06:50    


 


AST  45 U/L (15-37)  H D 01/17/18  06:50    


 


ALT  54 U/L (12-78)   01/17/18  06:50    


 


Alkaline Phosphatase  50 U/L ()  D 01/17/18  06:50    


 


Total Protein  6.4 g/dl (6.4-8.2)   01/17/18  06:50    


 


Albumin  3.3 g/dl (3.4-5.0)  L  01/17/18  06:50    











CT head reviewed





Plan


67 year old female with a significant past medical history of pituitary tumor s/

p resection 10/2017 and DM who presented to the ED via EMS for altered mental 

status. EMS also reported seizure activity on the way to the hospital for which 

the patient received versed 5mg. The patient was on Keppra 250mg twice daily 

which is typically a subtheraputic dose, increased to 750mg twice daily. She 

was admitted to ICU for close monitoring. She compelted CT head which showed 

suprasellar mass. 


Agree with hospitalist note, patient may best served to follow up with prior 

neurosurgeon and facility. 


Imaging does not appear to  warrent acute intervention, can be pursued 

outpatient


Continue Keppra 750mg twice daily


Positive Influenza A, ID following, continue Tamiflu (5 day course recommended)


Seizure free since admission


Neurologically stable

## 2018-01-17 NOTE — PN
Progress Note, Physician


History of Present Illness: 





OOB in chair


No complaints


Denies chest pain/ dyspnea/ cough


No c/o fever/ chills


Temps down


Leukopenic





- Current Medication List


Current Medications: 


Active Medications





Acetaminophen (Tylenol -)  650 mg PO Q6H PRN


   PRN Reason: FEVER


Amlodipine Besylate (Norvasc -)  5 mg PO DAILY [facility]


   Last Admin: 01/17/18 09:57 Dose:  5 mg


Cholecalciferol (Vitamin D3 -)  2,000 unit PO DAILY [facility]


   Last Admin: 01/17/18 09:57 Dose:  2,000 unit


Heparin Sodium (Porcine) (Heparin -)  5,000 unit SQ TID [facility]


   Last Admin: 01/17/18 06:08 Dose:  5,000 unit


Sodium Chloride (Normal Saline -)  1,000 mls @ 75 mls/hr IV ASDIR [facility]


   Last Admin: 01/16/18 09:56 Dose:  75 mls/hr


Insulin Aspart (Novolog Vial Sliding Scale -)  1 vial SQ TIDAC [facility]


   PRN Reason: Protocol


   Last Admin: 01/17/18 06:07 Dose:  Not Given


Insulin Aspart (Novolog Vial Sliding Scale -)  1 vial SQ HS [facility]


   PRN Reason: Protocol


   Last Admin: 01/16/18 21:15 Dose:  Not Given


Levetiracetam (Keppra -)  750 mg PO BID [facility]


   Last Admin: 01/17/18 09:57 Dose:  750 mg


Levothyroxine Sodium (Synthroid -)  75 mcg PO DAILY@0700 [facility]


   Last Admin: 01/17/18 06:08 Dose:  75 mcg


Metoprolol Tartrate (Lopressor -)  50 mg PO BID [facility]


   Last Admin: 01/17/18 09:57 Dose:  50 mg


Oseltamivir Phosphate (Tamiflu -)  75 mg PO BID [facility]


   Stop: 01/20/18 21:59


   Last Admin: 01/16/18 21:10 Dose:  75 mg


Pantoprazole Sodium (Protonix -)  40 mg PO DAILY [facility]


   Last Admin: 01/17/18 09:57 Dose:  40 mg











- Objective


Vital Signs: 


 Vital Signs











Temperature  98.2 F   01/17/18 05:45


 


Pulse Rate  73   01/17/18 05:45


 


Respiratory Rate  20   01/17/18 05:45


 


Blood Pressure  121/54   01/17/18 05:45


 


O2 Sat by Pulse Oximetry (%)  97   01/16/18 21:00











Constitutional: Yes: No Distress


Eyes: Yes: Conjunctiva Clear


Cardiovascular: Yes: Regular Rate and Rhythm, S1


Respiratory: Yes: CTA Bilaterally


Gastrointestinal: Yes: Normal Bowel Sounds, Soft.  No: Tenderness


Labs: 


 CBC, BMP





 01/17/18 06:50 





 01/17/18 06:50 





 INR, PTT











INR  1.16  (0.82-1.09)  H  01/14/18  23:55    














Assessment/Plan





Acute influenza A


Leukopenia secondary to viral infection


Possible aspiration


S/P seizure


S/P resection, pituitary tumor


Comple 5d course Tamiflu

## 2018-01-17 NOTE — PN
Progress Note, Physician


Chief Complaint: 


Ms Farnsworth is without complaint this am. Denies cp, sob, n/v, or any other 

concerns. Has no symptoms of flu.





- Current Medication List


Current Medications: 


Active Medications





Acetaminophen (Tylenol -)  650 mg PO Q6H PRN


   PRN Reason: FEVER


Amlodipine Besylate (Norvasc -)  5 mg PO DAILY [facility]


   Last Admin: 01/17/18 09:57 Dose:  5 mg


Cholecalciferol (Vitamin D3 -)  2,000 unit PO DAILY [facility]


   Last Admin: 01/17/18 09:57 Dose:  2,000 unit


Heparin Sodium (Porcine) (Heparin -)  5,000 unit SQ TID [facility]


   Last Admin: 01/17/18 06:08 Dose:  5,000 unit


Sodium Chloride (Normal Saline -)  1,000 mls @ 75 mls/hr IV ASDIR [facility]


   Last Admin: 01/17/18 11:46 Dose:  75 mls/hr


Insulin Aspart (Novolog Vial Sliding Scale -)  1 vial SQ TIDAC [facility]


   PRN Reason: Protocol


   Last Admin: 01/17/18 11:50 Dose:  Not Given


Insulin Aspart (Novolog Vial Sliding Scale -)  1 vial SQ HS [facility]


   PRN Reason: Protocol


   Last Admin: 01/16/18 21:15 Dose:  Not Given


Levetiracetam (Keppra -)  750 mg PO BID [facility]


   Last Admin: 01/17/18 09:57 Dose:  750 mg


Levothyroxine Sodium (Synthroid -)  75 mcg PO DAILY@0700 [facility]


   Last Admin: 01/17/18 06:08 Dose:  75 mcg


Metoprolol Tartrate (Lopressor -)  50 mg PO BID [facility]


   Last Admin: 01/17/18 09:57 Dose:  50 mg


Oseltamivir Phosphate (Tamiflu -)  75 mg PO BID [facility]


   Stop: 01/20/18 21:59


   Last Admin: 01/17/18 11:46 Dose:  75 mg


Pantoprazole Sodium (Protonix -)  40 mg PO DAILY [facility]


   Last Admin: 01/17/18 09:57 Dose:  40 mg











- Objective


Vital Signs: 


 Vital Signs











Temperature  36.7 C   01/17/18 10:00


 


Pulse Rate  89   01/17/18 10:00


 


Respiratory Rate  20   01/17/18 10:00


 


Blood Pressure  119/56   01/17/18 10:00


 


O2 Sat by Pulse Oximetry (%)  94 L  01/17/18 10:00











Constitutional: Yes: Well Nourished, No Distress, Calm


Cardiovascular: Yes: Regular Rate and Rhythm.  No: Gallop, Murmur, Rub


Respiratory: Yes: Regular, CTA Bilaterally.  No: Rales, Rhonchi, Wheezes


Gastrointestinal: Yes: Normal Bowel Sounds, Soft.  No: Distention, Tenderness


Extremities: Yes: WNL


Edema: No


Labs: 


 CBC, BMP





 01/17/18 06:50 





 01/17/18 06:50 





 INR, PTT











INR  1.16  (0.82-1.09)  H  01/14/18  23:55    














Problem List





- Problems


(1) Seizure


Code(s): R56.9 - UNSPECIFIED CONVULSIONS   





(2) Pituitary adenoma


Code(s): D35.2 - BENIGN NEOPLASM OF PITUITARY GLAND   





(3) T2DM (type 2 diabetes mellitus)


Code(s): E11.9 - TYPE 2 DIABETES MELLITUS WITHOUT COMPLICATIONS   





(4) Influenza A


Code(s): J10.1 - FLU DUE TO OTH IDENT INFLUENZA VIRUS W OTH RESP MANIFEST   





(5) Leukopenia


Code(s): D72.819 - DECREASED WHITE BLOOD CELL COUNT, UNSPECIFIED   


Qualifiers: 


   Leukopenia type: neutropenia   Neutropenia type: due to infection   

Qualified Code(s): D70.3 - Neutropenia due to infection   





Assessment/Plan





(1) Seizure


Assessment/Plan: 


-case d/w neurology


-continue increased keppra dose


-stable


Code(s): R56.9 - UNSPECIFIED CONVULSIONS   





(2) Pituitary adenoma


Assessment/Plan: 


-currently unable to obtain MRI


-may benefit from outpatient follow up with previous surgeon and physicians


-not urgent, discussed with patient and will set up outpatient appointment


Code(s): D35.2 - BENIGN NEOPLASM OF PITUITARY GLAND   





(3) T2DM (type 2 diabetes mellitus)


Assessment/Plan: 


-diabetic diet


-continue insulin


Code(s): E11.9 - TYPE 2 DIABETES MELLITUS WITHOUT COMPLICATIONS   





(4) Influenza A


Assessment/Plan: 


-continue tamiflu, needs total of 5 days


-ID note reviewed


Code(s): J10.1 - FLU DUE TO OTH IDENT INFLUENZA VIRUS W OTH RESP MANIFEST   





(5) Neutropenia secondary to infection


-secondary to influenza


-monitor for improvement

## 2018-01-18 VITALS — TEMPERATURE: 98.7 F | HEART RATE: 71 BPM | SYSTOLIC BLOOD PRESSURE: 114 MMHG | DIASTOLIC BLOOD PRESSURE: 65 MMHG

## 2018-01-18 LAB
ANION GAP SERPL CALC-SCNC: 9 MMOL/L (ref 8–16)
BASOPHILS # BLD: 1.4 % (ref 0–2)
BUN SERPL-MCNC: 3 MG/DL (ref 7–18)
CALCIUM SERPL-MCNC: 8.2 MG/DL (ref 8.5–10.1)
CHLORIDE SERPL-SCNC: 108 MMOL/L (ref 98–107)
CO2 SERPL-SCNC: 26 MMOL/L (ref 21–32)
CREAT SERPL-MCNC: 0.6 MG/DL (ref 0.55–1.02)
DEPRECATED RDW RBC AUTO: 14.6 % (ref 11.6–15.6)
EOSINOPHIL # BLD: 9.7 % (ref 0–4.5)
GLUCOSE SERPL-MCNC: 90 MG/DL (ref 74–106)
HCT VFR BLD CALC: 38.2 % (ref 32.4–45.2)
HGB BLD-MCNC: 12.5 GM/DL (ref 10.7–15.3)
LYMPHOCYTES # BLD: 52.4 % (ref 8–40)
MAGNESIUM SERPL-MCNC: 2.1 MG/DL (ref 1.8–2.4)
MCH RBC QN AUTO: 29.2 PG (ref 25.7–33.7)
MCHC RBC AUTO-ENTMCNC: 32.8 G/DL (ref 32–36)
MCV RBC: 89.3 FL (ref 80–96)
MONOCYTES # BLD AUTO: 9.6 % (ref 3.8–10.2)
NEUTROPHILS # BLD: 26.9 % (ref 42.8–82.8)
PHOSPHATE SERPL-MCNC: 2.2 MG/DL (ref 2.5–4.9)
PLATELET # BLD AUTO: 219 K/MM3 (ref 134–434)
PMV BLD: 10.2 FL (ref 7.5–11.1)
POTASSIUM SERPLBLD-SCNC: 3.9 MMOL/L (ref 3.5–5.1)
RBC # BLD AUTO: 4.27 M/MM3 (ref 3.6–5.2)
SODIUM SERPL-SCNC: 143 MMOL/L (ref 136–145)
WBC # BLD AUTO: 2.8 K/MM3 (ref 4–10)

## 2018-01-18 RX ADMIN — LEVETIRACETAM SCH MG: 250 TABLET, FILM COATED ORAL at 09:52

## 2018-01-18 RX ADMIN — LEVOTHYROXINE SODIUM SCH MCG: 75 TABLET ORAL at 06:03

## 2018-01-18 RX ADMIN — AMLODIPINE BESYLATE SCH MG: 5 TABLET ORAL at 09:52

## 2018-01-18 RX ADMIN — HEPARIN SODIUM SCH UNIT: 5000 INJECTION, SOLUTION INTRAVENOUS; SUBCUTANEOUS at 06:02

## 2018-01-18 RX ADMIN — VITAMIN D, TAB 1000IU (100/BT) SCH UNIT: 25 TAB at 09:52

## 2018-01-18 RX ADMIN — SODIUM CHLORIDE SCH: 9 INJECTION, SOLUTION INTRAVENOUS at 06:56

## 2018-01-18 RX ADMIN — SODIUM CHLORIDE SCH MLS/HR: 9 INJECTION, SOLUTION INTRAVENOUS at 06:02

## 2018-01-18 RX ADMIN — OSELTAMIVIR PHOSPHATE SCH MG: 75 CAPSULE ORAL at 09:54

## 2018-01-18 RX ADMIN — HEPARIN SODIUM SCH UNIT: 5000 INJECTION, SOLUTION INTRAVENOUS; SUBCUTANEOUS at 14:03

## 2018-01-18 RX ADMIN — METOPROLOL TARTRATE SCH MG: 50 TABLET, FILM COATED ORAL at 09:51

## 2018-01-18 RX ADMIN — PANTOPRAZOLE SODIUM SCH MG: 40 TABLET, DELAYED RELEASE ORAL at 09:53

## 2018-01-18 RX ADMIN — INSULIN ASPART SCH: 100 INJECTION, SOLUTION INTRAVENOUS; SUBCUTANEOUS at 12:44

## 2018-01-18 RX ADMIN — POTASSIUM & SODIUM PHOSPHATES POWDER PACK 280-160-250 MG SCH PACKET: 280-160-250 PACK at 09:54

## 2018-01-18 RX ADMIN — INSULIN ASPART SCH: 100 INJECTION, SOLUTION INTRAVENOUS; SUBCUTANEOUS at 06:08

## 2018-01-18 NOTE — PN
Progress Note, Physician


History of Present Illness: 


Awake, alert in bed


No complaints


Denies chest pain/ dyspnea/ cough


No c/o fever/ chills


 





- Current Medication List


Current Medications: 


Active Medications





Acetaminophen (Tylenol -)  650 mg PO Q6H PRN


   PRN Reason: FEVER


Amlodipine Besylate (Norvasc -)  [facility]ILY [facility]


   Last Admin: 01/17/18 09:57 Dose:  5 mg


Cholecalciferol (Vitamin D3 -)  2,000 unit PO DAILY [facility]


   Last Admin: 01/17/18 09:57 Dose:  2,000 unit


Heparin Sodium (Porcine) (Heparin -)  5,000 unit SQ TID [facility]


   Last Admin: 01/18/18 06:02 Dose:  5,000 unit


Sodium Chloride (Normal Saline -)  1,000 mls @ 75 mls/hr IV [facility]IR [facility]


   Last Admin: 01/18/18 06:56 Dose:  Not Given


Insulin Aspart (Novolog Vial Sliding Scale -)  1 vial SQ TIDAC [facility]


   PRN Reason: Protocol


   Last Admin: 01/18/18 06:08 Dose:  Not Given


Insulin Aspart (Novolog Vial Sliding Scale -)  1 vial SQ HS [facility]


   PRN Reason: Protocol


   Last Admin: 01/17/18 22:43 Dose:  Not Given


Levetiracetam (Keppra -)  750 mg PO BID [facility]


   Last Admin: 01/17/18 22:42 Dose:  750 mg


Levothyroxine Sodium (Synthroid -)  75 mcg PO DAILY@0700 [facility]


   Last Admin: 01/18/18 06:03 Dose:  75 mcg


Metoprolol Tartrate (Lopressor -)  50 mg PO BID [facility]


   Last Admin: 01/17/18 22:42 Dose:  50 mg


Oseltamivir Phosphate (Tamiflu -)  75 mg PO BID [facility]


   Stop: 01/20/18 21:59


   Last Admin: 01/17/18 22:42 Dose:  75 mg


Pantoprazole Sodium (Protonix -)  40 mg PO DAILY [facility]


   Last Admin: 01/17/18 09:57 Dose:  40 mg


Potassium Phos/Sodium Phos (Phos-Nak Packet -)  1 packet PO BID [facility]


   Last Admin: 01/17/18 22:43 Dose:  1 packet











- Objective


Vital Signs: 


 Vital Signs











Temperature  98.1 F   01/18/18 06:00


 


Pulse Rate  115 H  01/18/18 06:00


 


Respiratory Rate  20   01/18/18 06:00


 


Blood Pressure  107/64   01/18/18 06:00


 


O2 Sat by Pulse Oximetry (%)  93 L  01/17/18 21:00











Constitutional: Yes: No Distress


Eyes: Yes: Conjunctiva Clear


Cardiovascular: Yes: Regular Rate and Rhythm, S1, S2


Respiratory: Yes: CTA Bilaterally


Gastrointestinal: Yes: Normal Bowel Sounds, Soft.  No: Tenderness


Edema: No


Labs: 


 CBC, BMP





 01/18/18 05:35 





 01/18/18 05:35 





 INR, PTT











INR  1.16  (0.82-1.09)  H  01/14/18  23:55    














Assessment/Plan





Acute influenza A


Leukopenia secondary to viral infection


Possible aspiration


S/P seizure


S/P resection, pituitary tumor


Comple 5d course Tamiflu


OK for discharge on po Tamiflu

## 2018-01-18 NOTE — DS
Physical Examination


Vital Signs: 


 Vital Signs











Temperature  36.9 C   01/18/18 10:00


 


Pulse Rate  72   01/18/18 10:00


 


Respiratory Rate  20   01/18/18 10:00


 


Blood Pressure  106/59   01/18/18 10:00


 


O2 Sat by Pulse Oximetry (%)  93 L  01/18/18 10:00











Constitutional: Yes: Well Nourished, No Distress, Calm


Cardiovascular: Yes: Regular Rate and Rhythm.  No: Gallop, Murmur, Rub


Respiratory: Yes: Regular, CTA Bilaterally.  No: Rales, Rhonchi, Wheezes


Gastrointestinal: Yes: Normal Bowel Sounds, Soft.  No: Distention, Tenderness


Extremities: Yes: WNL


Edema: No


Labs: 


 CBC, BMP





 01/18/18 05:35 





 01/18/18 05:35 











Discharge Summary


Reason For Visit: AMS/SEIZURE/INFLUENZA VIRUS


Current Active Problems





Altered mental status (Acute)


Dehydration (Acute)


Elevated troponin I level (Acute)


Fever (Acute)


Influenza A (Acute)


Leukopenia (Acute)


Pituitary adenoma (Acute)


Pneumonia (Acute)


Rhonchi (Acute)


Seizure (Acute)


T2DM (type 2 diabetes mellitus) (Acute)








Hospital Course: 





(1) Seizure


Code(s): R56.9 - UNSPECIFIED CONVULSIONS   





(2) Pituitary adenoma


Code(s): D35.2 - BENIGN NEOPLASM OF PITUITARY GLAND   





(3) T2DM (type 2 diabetes mellitus)


Code(s): E11.9 - TYPE 2 DIABETES MELLITUS WITHOUT COMPLICATIONS   





(4) Influenza A


Code(s): J10.1 - FLU DUE TO OTH IDENT INFLUENZA VIRUS W OTH RESP MANIFEST   





(5) Leukopenia


Code(s): D72.819 - DECREASED WHITE BLOOD CELL COUNT, UNSPECIFIED   


Qualifiers: 


   Leukopenia type: neutropenia   Neutropenia type: due to infection   

Qualified Code(s): D70.3 - Neutropenia due to infection   





Mrs Farnsworth is a pleasant 67 year old female who came in with seizures and was 

found to have influenza. She was admitted to the hospital and seen by 

neurology. Her keppra was increased and she did not have second seizure. CT 

scan showed pituitary adenoma, it was unclear if this was treated in the past 

secondary to patient being a poor historian. Her issues were not acute though 

and recommended she follow up with her outpatient neurosurgery at [facility]. This was reiterated multiple times and patient understood. She was 

seen by ID and started on tamiflu. She had neutropenia secondary to influenza 

but this stabilized. She is currently stable and ready for discharge home.





32 minutes spent in preparation of this discharge


Condition: Stable





- Instructions


Diet, Activity, Other Instructions: 


resume previous diet and activity


Referrals: 


Vu Mcgowan MD [Staff Physician] - 


Disposition: HOME





- Home Medications


Comprehensive Discharge Medication List: 


Ambulatory Orders





Amlodipine Besylate 5 mg PO DAILY 12/26/17 


Cholecalciferol (Vitamin D3) [Vitamin D3] 2,000 unit PO DAILY 12/26/17 


Levothyroxine Sodium [Levo-T] 75 mcg PO DAILY 12/26/17 


Metoprolol Tartrate 50 mg PO BID 12/26/17 


Pantoprazole Sodium 40 mg PO DAILY 12/26/17 


Levetiracetam [Keppra -] 750 mg PO BID #120 tablet 01/18/18 


Oseltamivir Phosphate [Tamiflu -] 75 mg PO BID #4 capsule 01/18/18

## 2018-01-18 NOTE — PN
Progress Note (short form)





- Note


Progress Note: 





 Neurology








History


67 year old female with a significant past medical history of pituitary tumor s/

p resection 10/2017 and DM who presented to the ED via EMS for altered mental 

status. EMS also reported seizure activity on the way to the hospital for which 

the patient received versed 5mg. The patient was on Keppra 250mg twice daily 

which is typically a subtheraputic dose, increased to 750mg twice daily. She 

was admitted to ICU and downgraded to floor. She compelted CT head which showed 

suprasellar mass. Reportedly, she completed surgical intervention at 

[facility] in October 2017. Dr. Oneil covering over weekend, 

ordered MRI pituatary. She was also found to have positive Influenza A, being 

treated with Tamiflu. Hospitalist note reviewed and in agreement that patient 

may best served to follow up with prior neurosurgeon and facility. Imaging does 

not appear to  warrant acute intervention can be pursued outpatient. NSGY note 

reviewed and noted does not appear apoplectic or pressuring chiasm. No new 

neurologic events at this time. 





Active Medications





Acetaminophen (Tylenol -)  650 mg PO Q6H PRN


   PRN Reason: FEVER


Amlodipine Besylate (Norvasc -)  5 mg PO DAILY [facility]


   Last Admin: 01/17/18 09:57 Dose:  5 mg


Cholecalciferol (Vitamin D3 -)  2,000 unit PO DAILY [facility]


   Last Admin: 01/17/18 09:57 Dose:  2,000 unit


Heparin Sodium (Porcine) (Heparin -)  5,000 unit SQ TID [facility]


   Last Admin: 01/18/18 06:02 Dose:  5,000 unit


Sodium Chloride (Normal Saline -)  1,000 mls @ 75 mls/hr IV ASDIR [facility]


   Last Admin: 01/18/18 06:56 Dose:  Not Given


Insulin Aspart (Novolog Vial Sliding Scale -)  1 vial SQ TIDAC [facility]


   PRN Reason: Protocol


   Last Admin: 01/18/18 06:08 Dose:  Not Given


Insulin Aspart (Novolog Vial Sliding Scale -)  1 vial SQ HS [facility]


   PRN Reason: Protocol


   Last Admin: 01/17/18 22:43 Dose:  Not Given


Levetiracetam (Keppra -)  750 mg PO BID [facility]


   Last Admin: 01/17/18 22:42 Dose:  750 mg


Levothyroxine Sodium (Synthroid -)  75 mcg PO DAILY@0700 [facility]


   Last Admin: 01/18/18 06:03 Dose:  75 mcg


Metoprolol Tartrate (Lopressor -)  50 mg PO BID [facility]


   Last Admin: 01/17/18 22:42 Dose:  50 mg


Oseltamivir Phosphate (Tamiflu -)  75 mg PO BID [facility]


   Stop: 01/20/18 21:59


   Last Admin: 01/17/18 22:42 Dose:  75 mg


Pantoprazole Sodium (Protonix -)  40 mg PO DAILY [facility]


   Last Admin: 01/17/18 09:57 Dose:  40 mg


Potassium Phos/Sodium Phos (Phos-Nak Packet -)  1 packet PO BID [facility]


   Last Admin: 01/17/18 22:43 Dose:  1 packet




















Physical Exam-Neuro


 


 


 Vital Signs











Temperature  98.1 F   01/18/18 06:00


 


Pulse Rate  115 H  01/18/18 06:00


 


Respiratory Rate  20   01/18/18 06:00


 


Blood Pressure  107/64   01/18/18 06:00


 


O2 Sat by Pulse Oximetry (%)  93 L  01/17/18 21:00














Examination


Alert oriented x 3, speech is normal able to follow command


EOMI , vf is normal by confrontation and pupils is reactive


No facial asymmetry, 


Moving all extremity, strenght grossly intact


Sensory normal


No abnormal movements


 


 


 CBCD











WBC  2.8 K/mm3 (4.0-10.0)  L  01/18/18  05:35    


 


RBC  4.27 M/mm3 (3.60-5.2)   01/18/18  05:35    


 


Hgb  12.5 GM/dL (10.7-15.3)   01/18/18  05:35    


 


Hct  38.2 % (32.4-45.2)   01/18/18  05:35    


 


MCV  89.3 fl (80-96)   01/18/18  05:35    


 


MCHC  32.8 g/dl (32.0-36.0)   01/18/18  05:35    


 


RDW  14.6 % (11.6-15.6)   01/18/18  05:35    


 


Plt Count  219 K/MM3 (134-434)   01/18/18  05:35    


 


MPV  10.2 fl (7.5-11.1)   01/18/18  05:35    








 CMP











Sodium  143 mmol/L (136-145)   01/18/18  05:35    


 


Potassium  3.9 mmol/L (3.5-5.1)   01/18/18  05:35    


 


Chloride  108 mmol/L ()  H  01/18/18  05:35    


 


Carbon Dioxide  26 mmol/L (21-32)   01/18/18  05:35    


 


Anion Gap  9  (8-16)   01/18/18  05:35    


 


BUN  3 mg/dL (7-18)  L  01/18/18  05:35    


 


Creatinine  0.6 mg/dL (0.55-1.02)   01/18/18  05:35    


 


Creat Clearance w eGFR  > 60  (>60)   01/17/18  06:50    


 


Calcium  8.2 mg/dL (8.5-10.1)  L  01/18/18  05:35    


 


Total Bilirubin  0.8 mg/dL (0.2-1.0)   01/17/18  06:50    


 


AST  45 U/L (15-37)  H D 01/17/18  06:50    


 


ALT  54 U/L (12-78)   01/17/18  06:50    


 


Alkaline Phosphatase  50 U/L ()  D 01/17/18  06:50    


 


Total Protein  6.4 g/dl (6.4-8.2)   01/17/18  06:50    


 


Albumin  3.3 g/dl (3.4-5.0)  L  01/17/18  06:50    














CT head reviewed





Plan


67 year old female with a significant past medical history of pituitary tumor s/

p resection 10/2017 and DM who presented to the ED via EMS for altered mental 

status. EMS also reported seizure activity on the way to the hospital for which 

the patient received versed 5mg. The patient was on Keppra 250mg twice daily 

which is typically a subtheraputic dose, increased to 750mg twice daily. She 

was admitted to ICU for close monitoring. She completed CT head which showed 

suprasellar mass. 


Agree with hospitalist note, patient may best served to follow up with prior 

neurosurgeon and facility. 


Imaging does not appear to  warrent acute intervention, can be pursued 

outpatient


Continue Keppra 750mg twice daily


Positive Influenza A, ID following, continue Tamiflu (5 day course recommended)


Seizure free since admission


Neurologically stable

## 2018-01-18 NOTE — PN
Progress Note (short form)





- Note


Progress Note: 


Overall appears better. 


No CP or SOB. 


 Intake & Output











 01/15/18 01/16/18 01/17/18 01/18/18





 23:59 23:59 23:59 23:59


 


Intake Total 2290 2135 2110 250


 


Output Total 1625 1400 1800 


 


Balance 665 735 310 250


 


Weight 173 lb 6 oz   








 Last Vital Signs











Temp Pulse Resp BP Pulse Ox


 


 98.4 F   72   20   106/59   93 L


 


 01/18/18 10:00  01/18/18 10:00  01/18/18 10:00  01/18/18 10:00  01/18/18 10:00








Active Medications





Acetaminophen (Tylenol -)  650 mg PO Q6H PRN


   PRN Reason: FEVER


Amlodipine Besylate (Norvasc -)  5 mg PO DAILY [facility]


   Last Admin: 01/18/18 09:52 Dose:  5 mg


Cholecalciferol (Vitamin D3 -)  2,000 unit PO DAILY [facility]


   Last Admin: 01/18/18 09:52 Dose:  2,000 unit


Heparin Sodium (Porcine) (Heparin -)  5,000 unit SQ TID [facility]


   Last Admin: 01/18/18 06:02 Dose:  5,000 unit


Sodium Chloride (Normal Saline -)  1,000 mls @ 75 mls/hr IV ASDIR [facility]


   Last Admin: 01/18/18 06:56 Dose:  Not Given


Insulin Aspart (Novolog Vial Sliding Scale -)  1 vial SQ TIDAC [facility]


   PRN Reason: Protocol


   Last Admin: 01/18/18 12:44 Dose:  Not Given


Insulin Aspart (Novolog Vial Sliding Scale -)  1 vial SQ HS [facility]


   PRN Reason: Protocol


   Last Admin: 01/17/18 22:43 Dose:  Not Given


Levetiracetam (Keppra -)  750 mg PO BID [facility]


   Last Admin: 01/18/18 09:52 Dose:  750 mg


Levothyroxine Sodium (Synthroid -)  75 mcg PO DAILY@0700 [facility]


   Last Admin: 01/18/18 06:03 Dose:  75 mcg


Metoprolol Tartrate (Lopressor -)  50 mg PO BID [facility]


   Last Admin: 01/18/18 09:51 Dose:  50 mg


Oseltamivir Phosphate (Tamiflu -)  75 mg PO BID [facility]


   Stop: 01/20/18 21:59


   Last Admin: 01/18/18 09:54 Dose:  75 mg


Pantoprazole Sodium (Protonix -)  40 mg PO DAILY [facility]


   Last Admin: 01/18/18 09:53 Dose:  40 mg


Potassium Phos/Sodium Phos (Phos-Nak Packet -)  1 packet PO BID [facility]


   Last Admin: 01/18/18 09:54 Dose:  1 packet








Gen:  NAD 


Heart: RRR


Lung: decreased breath sounds at the bases


Abd: soft, nontender


Ext: no edema


 Laboratory Results - last 24 hr











  01/17/18 01/17/18 01/18/18





  17:19 22:41 05:35


 


WBC    2.8 L


 


RBC    4.27


 


Hgb    12.5


 


Hct    38.2


 


MCV    89.3


 


MCH    29.2


 


MCHC    32.8


 


RDW    14.6


 


Plt Count    219


 


MPV    10.2


 


Neutrophils %    26.9 L


 


Lymphocytes %    52.4 H


 


Monocytes %    9.6


 


Eosinophils %    9.7 H


 


Basophils %    1.4


 


Sodium   


 


Potassium   


 


Chloride   


 


Carbon Dioxide   


 


Anion Gap   


 


BUN   


 


Creatinine   


 


POC Glucometer  92  96 


 


Random Glucose   


 


Calcium   


 


Phosphorus   


 


Magnesium   














  01/18/18 01/18/18 01/18/18





  05:35 06:07 11:45


 


WBC   


 


RBC   


 


Hgb   


 


Hct   


 


MCV   


 


MCH   


 


MCHC   


 


RDW   


 


Plt Count   


 


MPV   


 


Neutrophils %   


 


Lymphocytes %   


 


Monocytes %   


 


Eosinophils %   


 


Basophils %   


 


Sodium  143  


 


Potassium  3.9  


 


Chloride  108 H  


 


Carbon Dioxide  26  


 


Anion Gap  9  


 


BUN  3 L  


 


Creatinine  0.6  


 


POC Glucometer   91  108


 


Random Glucose  90  


 


Calcium  8.2 L  


 


Phosphorus  2.2 L  


 


Magnesium  2.1  














ASSESSMENT AND PLAN:


Seizure Episode


Pituitary Tumor


Influenza A


HTN


DM


Hypothyroidism








-  AEDs per Neuro 


-  seizure precautions


-  Tamiflu


-  PO as tolerated


-  DVT prophylaxis


-  D/C planning 





Dr Smith